# Patient Record
Sex: MALE | Race: WHITE | NOT HISPANIC OR LATINO | Employment: FULL TIME | URBAN - METROPOLITAN AREA
[De-identification: names, ages, dates, MRNs, and addresses within clinical notes are randomized per-mention and may not be internally consistent; named-entity substitution may affect disease eponyms.]

---

## 2019-03-31 ENCOUNTER — HOSPITAL ENCOUNTER (EMERGENCY)
Facility: HOSPITAL | Age: 54
Discharge: HOME | End: 2019-04-01
Attending: EMERGENCY MEDICINE
Payer: COMMERCIAL

## 2019-03-31 DIAGNOSIS — R11.2 NAUSEA AND VOMITING, INTRACTABILITY OF VOMITING NOT SPECIFIED, UNSPECIFIED VOMITING TYPE: ICD-10-CM

## 2019-03-31 DIAGNOSIS — R10.13 EPIGASTRIC PAIN: Primary | ICD-10-CM

## 2019-03-31 LAB
BASOPHILS # BLD AUTO: 0.04 K/UL (ref 0–0.2)
BASOPHILS NFR BLD: 0.6 % (ref 0–1.9)
DIFFERENTIAL METHOD: ABNORMAL
EOSINOPHIL # BLD AUTO: 0.1 K/UL (ref 0–0.5)
EOSINOPHIL NFR BLD: 1.4 % (ref 0–8)
ERYTHROCYTE [DISTWIDTH] IN BLOOD BY AUTOMATED COUNT: 12.6 % (ref 11.5–14.5)
HCT VFR BLD AUTO: 45.3 % (ref 40–54)
HGB BLD-MCNC: 15.5 G/DL (ref 14–18)
LYMPHOCYTES # BLD AUTO: 1.7 K/UL (ref 1–4.8)
LYMPHOCYTES NFR BLD: 23.4 % (ref 18–48)
MCH RBC QN AUTO: 32.1 PG (ref 27–31)
MCHC RBC AUTO-ENTMCNC: 34.2 G/DL (ref 32–36)
MCV RBC AUTO: 94 FL (ref 82–98)
MONOCYTES # BLD AUTO: 1 K/UL (ref 0.3–1)
MONOCYTES NFR BLD: 13.5 % (ref 4–15)
NEUTROPHILS # BLD AUTO: 4.3 K/UL (ref 1.8–7.7)
NEUTROPHILS NFR BLD: 61.1 % (ref 38–73)
PLATELET # BLD AUTO: 374 K/UL (ref 150–350)
PMV BLD AUTO: 9.1 FL (ref 9.2–12.9)
RBC # BLD AUTO: 4.83 M/UL (ref 4.6–6.2)
WBC # BLD AUTO: 7.06 K/UL (ref 3.9–12.7)

## 2019-03-31 PROCEDURE — 81000 URINALYSIS NONAUTO W/SCOPE: CPT

## 2019-03-31 PROCEDURE — 99284 EMERGENCY DEPT VISIT MOD MDM: CPT

## 2019-03-31 PROCEDURE — 96361 HYDRATE IV INFUSION ADD-ON: CPT

## 2019-03-31 PROCEDURE — 85025 COMPLETE CBC W/AUTO DIFF WBC: CPT

## 2019-03-31 PROCEDURE — 80053 COMPREHEN METABOLIC PANEL: CPT

## 2019-03-31 PROCEDURE — C9113 INJ PANTOPRAZOLE SODIUM, VIA: HCPCS | Performed by: PHYSICIAN ASSISTANT

## 2019-03-31 PROCEDURE — 63600175 PHARM REV CODE 636 W HCPCS: Performed by: PHYSICIAN ASSISTANT

## 2019-03-31 PROCEDURE — 83690 ASSAY OF LIPASE: CPT

## 2019-03-31 PROCEDURE — 96374 THER/PROPH/DIAG INJ IV PUSH: CPT

## 2019-03-31 PROCEDURE — 25000003 PHARM REV CODE 250: Performed by: PHYSICIAN ASSISTANT

## 2019-03-31 PROCEDURE — 96375 TX/PRO/DX INJ NEW DRUG ADDON: CPT

## 2019-03-31 RX ORDER — PANTOPRAZOLE SODIUM 40 MG/10ML
40 INJECTION, POWDER, LYOPHILIZED, FOR SOLUTION INTRAVENOUS
Status: COMPLETED | OUTPATIENT
Start: 2019-03-31 | End: 2019-03-31

## 2019-03-31 RX ORDER — KETOROLAC TROMETHAMINE 30 MG/ML
15 INJECTION, SOLUTION INTRAMUSCULAR; INTRAVENOUS
Status: COMPLETED | OUTPATIENT
Start: 2019-03-31 | End: 2019-03-31

## 2019-03-31 RX ORDER — MAG HYDROX/ALUMINUM HYD/SIMETH 200-200-20
30 SUSPENSION, ORAL (FINAL DOSE FORM) ORAL
Status: COMPLETED | OUTPATIENT
Start: 2019-03-31 | End: 2019-03-31

## 2019-03-31 RX ORDER — ONDANSETRON 2 MG/ML
4 INJECTION INTRAMUSCULAR; INTRAVENOUS
Status: COMPLETED | OUTPATIENT
Start: 2019-03-31 | End: 2019-03-31

## 2019-03-31 RX ADMIN — KETOROLAC TROMETHAMINE 15 MG: 30 INJECTION, SOLUTION INTRAMUSCULAR; INTRAVENOUS at 11:03

## 2019-03-31 RX ADMIN — SODIUM CHLORIDE 1000 ML: 0.9 INJECTION, SOLUTION INTRAVENOUS at 11:03

## 2019-03-31 RX ADMIN — ONDANSETRON 4 MG: 2 INJECTION INTRAMUSCULAR; INTRAVENOUS at 11:03

## 2019-03-31 RX ADMIN — ALUMINUM HYDROXIDE, MAGNESIUM HYDROXIDE, AND SIMETHICONE 30 ML: 200; 200; 20 SUSPENSION ORAL at 11:03

## 2019-03-31 RX ADMIN — PANTOPRAZOLE SODIUM 40 MG: 40 INJECTION, POWDER, FOR SOLUTION INTRAVENOUS at 11:03

## 2019-04-01 VITALS
OXYGEN SATURATION: 96 % | TEMPERATURE: 99 F | RESPIRATION RATE: 18 BRPM | SYSTOLIC BLOOD PRESSURE: 120 MMHG | HEIGHT: 68 IN | DIASTOLIC BLOOD PRESSURE: 88 MMHG | HEART RATE: 86 BPM | WEIGHT: 140 LBS | BODY MASS INDEX: 21.22 KG/M2

## 2019-04-01 LAB
ALBUMIN SERPL BCP-MCNC: 4 G/DL (ref 3.5–5.2)
ALP SERPL-CCNC: 66 U/L (ref 55–135)
ALT SERPL W/O P-5'-P-CCNC: 14 U/L (ref 10–44)
ANION GAP SERPL CALC-SCNC: 7 MMOL/L (ref 8–16)
AST SERPL-CCNC: 21 U/L (ref 10–40)
BACTERIA #/AREA URNS HPF: ABNORMAL /HPF
BILIRUB SERPL-MCNC: 0.7 MG/DL (ref 0.1–1)
BILIRUB UR QL STRIP: NEGATIVE
BUN SERPL-MCNC: 14 MG/DL (ref 6–20)
CALCIUM SERPL-MCNC: 9.5 MG/DL (ref 8.7–10.5)
CHLORIDE SERPL-SCNC: 102 MMOL/L (ref 95–110)
CLARITY UR: CLEAR
CO2 SERPL-SCNC: 27 MMOL/L (ref 23–29)
COLOR UR: ABNORMAL
CREAT SERPL-MCNC: 1 MG/DL (ref 0.5–1.4)
EST. GFR  (AFRICAN AMERICAN): >60 ML/MIN/1.73 M^2
EST. GFR  (NON AFRICAN AMERICAN): >60 ML/MIN/1.73 M^2
GLUCOSE SERPL-MCNC: 101 MG/DL (ref 70–110)
GLUCOSE UR QL STRIP: NEGATIVE
HGB UR QL STRIP: NEGATIVE
HYALINE CASTS #/AREA URNS LPF: 5 /LPF
KETONES UR QL STRIP: ABNORMAL
LEUKOCYTE ESTERASE UR QL STRIP: NEGATIVE
LIPASE SERPL-CCNC: 12 U/L (ref 4–60)
MICROSCOPIC COMMENT: ABNORMAL
NITRITE UR QL STRIP: NEGATIVE
PH UR STRIP: 5 [PH] (ref 5–8)
POTASSIUM SERPL-SCNC: 4.2 MMOL/L (ref 3.5–5.1)
PROT SERPL-MCNC: 6.9 G/DL (ref 6–8.4)
PROT UR QL STRIP: ABNORMAL
RBC #/AREA URNS HPF: 1 /HPF (ref 0–4)
SODIUM SERPL-SCNC: 136 MMOL/L (ref 136–145)
SP GR UR STRIP: 1.03 (ref 1–1.03)
URN SPEC COLLECT METH UR: ABNORMAL
UROBILINOGEN UR STRIP-ACNC: ABNORMAL EU/DL
WBC #/AREA URNS HPF: 3 /HPF (ref 0–5)

## 2019-04-01 RX ORDER — HYDROCODONE BITARTRATE AND ACETAMINOPHEN 5; 325 MG/1; MG/1
1 TABLET ORAL EVERY 12 HOURS PRN
Qty: 5 TABLET | Refills: 0 | Status: SHIPPED | OUTPATIENT
Start: 2019-04-01 | End: 2019-04-04

## 2019-04-01 RX ORDER — PANTOPRAZOLE SODIUM 20 MG/1
40 TABLET, DELAYED RELEASE ORAL DAILY
Qty: 60 TABLET | Refills: 0 | Status: SHIPPED | OUTPATIENT
Start: 2019-04-01 | End: 2022-10-15

## 2019-04-01 RX ORDER — ALUMINUM HYDROXIDE, MAGNESIUM HYDROXIDE, AND SIMETHICONE 2400; 240; 2400 MG/30ML; MG/30ML; MG/30ML
10 SUSPENSION ORAL EVERY 6 HOURS PRN
Qty: 335 ML | Refills: 0 | Status: SHIPPED | OUTPATIENT
Start: 2019-04-01 | End: 2022-10-15

## 2019-04-01 NOTE — ED PROVIDER NOTES
"Encounter Date: 3/31/2019       History     Chief Complaint   Patient presents with    Abdominal Pain     Pt reports, "Abdominal spasms (2 weeks), gaging, and vomiting (1 1/2 week)".  Reports "A lot of recent dental work".  States, "the last time I felt like this, I had ulcers".  Reports hx of Crohn's    Vomiting     CC: Abdominal Pain; Vomiting     HPI:   53 y/o male with history of Crohns Disease presenting for evaluation of 3 week history of constant, cramping epigastric abdominal pain and feels like a "knot" with associated 2 week history of vomiting 2x/day worsening today with 5 episodes. Pain is worse with sitting up with eating. 2 week history of gagging and vomiting x2/day for 2 weeks but worse today with 5 episodes of vomiting.  He reports he drinks 1-2 beers per day. He reports he was diagnosed with ulcers and crohns flare in 2004 which feels slightly similar.. No attempted tx.  Denies history of gallstones, hematemesis, melena, hematochezia,  CP, SOB, dizziness, lightheadedness.             Review of patient's allergies indicates:  No Known Allergies  Past Medical History:   Diagnosis Date    Degenerative disk disease     Disc disease, degenerative, thoracic or thoracolumbar     DJD (degenerative joint disease)      Past Surgical History:   Procedure Laterality Date    KNEE SURGERY  2003     History reviewed. No pertinent family history.  Social History     Tobacco Use    Smoking status: Current Every Day Smoker     Packs/day: 0.50     Types: Cigarettes    Smokeless tobacco: Never Used   Substance Use Topics    Alcohol use: Yes     Comment: occasionally    Drug use: No     Review of Systems   Constitutional: Negative for chills and fever.   HENT: Negative for congestion, ear pain and rhinorrhea.    Eyes: Negative for redness.   Respiratory: Negative for cough and shortness of breath.    Cardiovascular: Negative for chest pain.   Gastrointestinal: Positive for abdominal pain, nausea and vomiting. " Negative for blood in stool, constipation and diarrhea.   Genitourinary: Negative for dysuria, frequency, hematuria and urgency.   Musculoskeletal: Negative for back pain and neck pain.   Skin: Negative for rash.   Neurological: Negative for dizziness, speech difficulty and light-headedness.   Psychiatric/Behavioral: Negative for confusion.       Physical Exam     Initial Vitals [03/31/19 2206]   BP Pulse Resp Temp SpO2   (!) 119/94 95 16 98.9 °F (37.2 °C) 95 %      MAP       --         Physical Exam    Nursing note and vitals reviewed.  Constitutional: He appears well-developed and well-nourished. No distress.   HENT:   Head: Normocephalic.   Right Ear: External ear normal.   Left Ear: External ear normal.   Nose: Nose normal.   Mouth/Throat: Oropharynx is clear and moist. No oropharyngeal exudate.   Eyes: Conjunctivae are normal.   Neck: Neck supple.   Cardiovascular: Normal rate and regular rhythm. Exam reveals no gallop and no friction rub.    No murmur heard.  Pulmonary/Chest: Breath sounds normal. No respiratory distress. He has no wheezes. He has no rhonchi. He has no rales.   Abdominal: Soft. Bowel sounds are normal. He exhibits no distension. There is tenderness in the epigastric area. There is no rigidity, no rebound, no guarding and no CVA tenderness.   Lymphadenopathy:     He has no cervical adenopathy.   Neurological: He is alert.   Skin: Skin is warm and dry. No rash noted.   Psychiatric: He has a normal mood and affect.         ED Course   Procedures  Labs Reviewed   CBC W/ AUTO DIFFERENTIAL - Abnormal; Notable for the following components:       Result Value    MCH 32.1 (*)     Platelets 374 (*)     MPV 9.1 (*)     All other components within normal limits   COMPREHENSIVE METABOLIC PANEL - Abnormal; Notable for the following components:    Anion Gap 7 (*)     All other components within normal limits   URINALYSIS, REFLEX TO URINE CULTURE - Abnormal; Notable for the following components:    Protein,  UA 1+ (*)     Ketones, UA Trace (*)     Urobilinogen, UA 2.0-3.0 (*)     All other components within normal limits    Narrative:     Preferred Collection Type->Urine, Clean Catch   URINALYSIS MICROSCOPIC - Abnormal; Notable for the following components:    Hyaline Casts, UA 5 (*)     All other components within normal limits    Narrative:     Preferred Collection Type->Urine, Clean Catch   LIPASE          Imaging Results          US Abdomen Limited (Final result)  Result time 04/01/19 00:32:26    Final result by Kaylee Juarez MD (04/01/19 00:32:26)                 Impression:      1. Mild prominence of the common bile duct measuring 7 mm.  No evidence of cholelithiasis are ultrasonographic evidence to suggest acute cholecystitis.  2. Otherwise no acute abnormalities identified.  3. Small 1.2 cm left hepatic lobe echogenic lesion.  This is nonspecific but may reflect a small hemangioma.  Recommend comparison with previous outside imaging if available, otherwise future ultrasound surveillance would be reasonable.  Alternatively future nonemergent MR follow-up could be obtained for attempt at further characterization.      Electronically signed by: Kaylee Juarez MD  Date:    04/01/2019  Time:    00:32             Narrative:    EXAMINATION:  US ABDOMEN LIMITED    CLINICAL HISTORY:  epigastric pain, nausea, vomiting;    TECHNIQUE:  Limited ultrasound of the right upper quadrant of the abdomen was performed.    COMPARISON:  None.    FINDINGS:  The liver is normal in size measuring 15.2cm.  Hepatic parenchyma is homogeneous in echotexture.  There is a small 1.2 cm echogenic lesion visualized within the left hepatic lobe.  No intrahepatic biliary ductal dilatation.  There is mild prominence of the common bile duct measuring 0.7 cm.  The gallbladder appears normal. No evidence for cholelithiasis.  Sonographic Melton's sign is negative. The visualized portions of the pancreas, IVC, and aorta appear normal. The spleen is  normal in size measuring 9.1 cm.  No ascites.                                 Medical Decision Making:   Initial Assessment:   54-year-old male with history of Crohn's disease and gastric ulcers presenting for evaluation of 3 day history of constant epigastric pain with 2 history of associated nausea and vomiting. Patient reports vomiting worsened today causing him to come to the emergency department.  Differentials:  Pancreatitis, cholelithiasis, choledocholithiasis cholecystitis, GERD, peptic ulcer disease, gastritis, bowel obstruction, acute abdomen.       Exam above.  Patient is afebrile, well-appearing in no distress. Mild epigastric tenderness to palpation.  Labs without evidence of significant electrolyte abnormality or leukocytosis or pancreatitis.    UA is negative for infection.  Ultrasound with mildly dilated common bile duct.  Incidental finding of lesion to hepatic lobe likely hemangioma.  Discussed this with patient and will have him follow up with GI for further evaluation management of his epigastric pain as well as primary care. Doubt acute abdomen at this time. Will discharge with protonix, maalox and norco or tylenol for pain. Return to ER for worsening symptoms or as needed.                       Clinical Impression:       ICD-10-CM ICD-9-CM   1. Epigastric pain R10.13 789.06   2. Nausea and vomiting, intractability of vomiting not specified, unspecified vomiting type R11.2 787.01                                Casi Wang PA-C  04/01/19 0235

## 2019-04-01 NOTE — ED TRIAGE NOTES
"Pt here for abd pain [id number]. Hx ulcers, crohns. "guero been gagging until I throw up". Denies fever, diarrhea.   "

## 2019-05-16 ENCOUNTER — TELEPHONE (OUTPATIENT)
Dept: FAMILY MEDICINE | Facility: CLINIC | Age: 54
End: 2019-05-16

## 2019-05-16 NOTE — TELEPHONE ENCOUNTER
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   was unable to make the appt due to an exhausted template.  He is requesting a returned call for scheduling.    He can be reached at [phone].    Thank you.

## 2019-05-24 ENCOUNTER — HOSPITAL ENCOUNTER (EMERGENCY)
Facility: HOSPITAL | Age: 54
Discharge: HOME | End: 2019-05-24
Attending: EMERGENCY MEDICINE
Payer: COMMERCIAL

## 2019-05-24 VITALS
BODY MASS INDEX: 23.34 KG/M2 | SYSTOLIC BLOOD PRESSURE: 147 MMHG | OXYGEN SATURATION: 100 % | TEMPERATURE: 99 F | DIASTOLIC BLOOD PRESSURE: 79 MMHG | RESPIRATION RATE: 20 BRPM | HEIGHT: 68 IN | HEART RATE: 78 BPM | WEIGHT: 154 LBS

## 2019-05-24 DIAGNOSIS — S62.356A CLOSED NONDISPLACED FRACTURE OF SHAFT OF FIFTH METACARPAL BONE OF RIGHT HAND, INITIAL ENCOUNTER: Primary | ICD-10-CM

## 2019-05-24 PROCEDURE — 99283 EMERGENCY DEPT VISIT LOW MDM: CPT | Mod: 25

## 2019-05-24 PROCEDURE — 29125 APPL SHORT ARM SPLINT STATIC: CPT | Mod: RT

## 2019-05-24 RX ORDER — GABAPENTIN 300 MG/1
CAPSULE ORAL
COMMUNITY
Start: 2019-05-09 | End: 2022-10-15

## 2019-05-24 RX ORDER — OXYCODONE AND ACETAMINOPHEN 10; 325 MG/1; MG/1
TABLET ORAL
Refills: 0 | COMMUNITY
Start: 2019-05-08 | End: 2020-04-23 | Stop reason: SDUPTHER

## 2019-05-24 NOTE — ED PROVIDER NOTES
Encounter Date: 5/24/2019    SCRIBE #1 NOTE: I, Olivia Bautista, am scribing for, and in the presence of,  Alexandr Becerra PA-C. I have scribed the following portions of the note - Other sections scribed: HPI, ROS.       History     Chief Complaint   Patient presents with    Hand Injury     Pt c/o pain in the right hand after hitting it on a took box last night, states he just wants to get an x-ray to make sure it's not broken     CC: Hand Injury    HPI: This 53 y/o male presents to the ED for evaluation of R hand pain after injury. Patient reports he slammed his R hand on a tool box out of frustration last night. Patient states he is concerned it might be broken and wants an x-ray. Patient is R handed. Patient broke 2nd digit of R hand previously. Patient denies fever, chills, leg swelling, congestion, or N/V/D. No alleviating or exacerbating factors reported.      The history is provided by the patient. No  was used.     Review of patient's allergies indicates:  No Known Allergies  Past Medical History:   Diagnosis Date    Crohn's disease     pt reported    Degenerative disk disease     Disc disease, degenerative, thoracic or thoracolumbar     DJD (degenerative joint disease)      Past Surgical History:   Procedure Laterality Date    KNEE SURGERY  2003     History reviewed. No pertinent family history.  Social History     Tobacco Use    Smoking status: Current Every Day Smoker     Packs/day: 0.50     Types: Cigarettes    Smokeless tobacco: Never Used   Substance Use Topics    Alcohol use: Yes     Comment: occasionally    Drug use: No     Review of Systems   Constitutional: Negative for chills and fever.   HENT: Negative for congestion, ear discharge, ear pain, nosebleeds, rhinorrhea and sore throat.    Respiratory: Negative for cough and chest tightness.    Cardiovascular: Negative for chest pain.   Gastrointestinal: Negative for abdominal pain, diarrhea, nausea and vomiting.    Genitourinary: Negative for dysuria, flank pain, hematuria and urgency.   Musculoskeletal: Negative for back pain, myalgias and neck pain.        (+) R hand pain   Skin: Negative for rash.   Neurological: Negative for dizziness, weakness, light-headedness, numbness and headaches.   Psychiatric/Behavioral: Negative for agitation.       Physical Exam     Initial Vitals [05/24/19 0659]   BP Pulse Resp Temp SpO2   (!) 128/95 74 18 98.4 °F (36.9 °C) 97 %      MAP       --         Physical Exam    Nursing note and vitals reviewed.  Constitutional: He appears well-developed and well-nourished. He is not diaphoretic. No distress.   HENT:   Head: Normocephalic and atraumatic.   Eyes: Conjunctivae and EOM are normal. Pupils are equal, round, and reactive to light.   Neck: Normal range of motion. Neck supple.   Cardiovascular: Intact distal pulses.   Pulmonary/Chest: No respiratory distress.   Abdominal: Soft. Bowel sounds are normal. He exhibits no distension. There is no tenderness.   Musculoskeletal:   R hand 5th metacarpal with dorsal swelling, ecchymosis. There is pain to palpation of entirety of this region. No obvious bony deformity. Small abrasion to proximal ulnar aspect of hand. Cap refill wnl all digits. Limited flexion of 5th digit.    Neurological: He is alert and oriented to person, place, and time. He has normal strength.   Skin: Skin is warm and dry. Capillary refill takes less than 2 seconds. No rash and no abscess noted. No erythema.   Psychiatric: He has a normal mood and affect. His behavior is normal. Judgment and thought content normal.         ED Course   Splint Application  Date/Time: 5/24/2019 8:18 AM  Performed by: Alexandr Becerra PA-C  Authorized by: Earnest Gastelum MD   Location details: right hand  Splint type: ulnar gutter  Supplies used: Ortho-Glass  Post-procedure: The splinted body part was neurovascularly unchanged following the procedure.  Patient tolerance: Patient tolerated the  procedure well with no immediate complications        Labs Reviewed - No data to display       Imaging Results          X-Ray Hand 3 view Right (Final result)  Result time 05/24/19 07:52:40    Final result by Bry Smith MD (05/24/19 07:52:40)                 Impression:      Fifth metacarpal fracture      Electronically signed by: Bry Smith MD  Date:    05/24/2019  Time:    07:52             Narrative:    EXAMINATION:  XR HAND COMPLETE 3 VIEW RIGHT    CLINICAL HISTORY:  R hand trauma; 5th metacarpal fx;    TECHNIQUE:  PA, lateral, and oblique views of the right hand were performed.    COMPARISON:  None    FINDINGS:  Distal shaft of the 5th metacarpal shows acute, obliquely oriented fracture with mild volar angulation but no dislocation.  Soft tissue swelling is present in the area.  No other acute fracture is seen.  Mild to moderate osteoarthritis is present at multiple joints.                                 Medical Decision Making:   Differential Diagnosis:   Fracture, contusion, sprain/strain, arthritis  Clinical Tests:   Radiological Study: Ordered and Reviewed  ED Management:  54-year-old male with chief complaint right hand pain.  He is right-handed.  Patient states he slammed his fist down on a metal tool box in frustration.  Here for imaging.  Previous fracture of 2nd digit, denies any surgery to hand.  No radiculopathy or paresthesia.  Swelling noted to entirety of dorsal 5th metacarpal region.  Decreased flexion 5th digit.  Cap refill normal to all digits.  There is a small abrasion to the base of the 5th metacarpal to the ulnar aspect of the hand.     There is a shaft fracture of the 5th metacarpal.  I do not think this represents an open fracture.  Less than 40° of shaft angulation.  No significant shaft shortening.  No significant malalignment.  I will place in ulnar gutter, have him follow up with Dr. Marie.  He does understand and agree with this plan.  Return precautions  given.            Scribe Attestation:   Scribe #1: I performed the above scribed service and the documentation accurately describes the services I performed. I attest to the accuracy of the note.    Attending Attestation:           Physician Attestation for Scribe:  Physician Attestation Statement for Scribe #1: I, Alexandr Becerra PA-C, reviewed documentation, as scribed by Olivia Bautista in my presence, and it is both accurate and complete.                    Clinical Impression:       ICD-10-CM ICD-9-CM   1. Closed nondisplaced fracture of shaft of fifth metacarpal bone of right hand, initial encounter S62.356A 815.03         Disposition:   Disposition: Discharged  Condition: Stable                        Alexandr Becerra PA-C  05/24/19 0818

## 2019-05-24 NOTE — DISCHARGE INSTRUCTIONS
Follow-up with Orthopedics next week for re-evaluation.  Continue taking your current pain medications.    Return to this ED if hand becomes red and warm, if your fingers become blue or discolored, if unable to tolerate pain, if any other problems occur.

## 2019-05-24 NOTE — ED TRIAGE NOTES
The pt states he hit his right hand on his tool kit yesterday. Now his right hand is swollen and hard for him to make a fist.

## 2020-04-23 ENCOUNTER — HOSPITAL ENCOUNTER (EMERGENCY)
Facility: HOSPITAL | Age: 55
Discharge: HOME | End: 2020-04-23
Attending: EMERGENCY MEDICINE
Payer: COMMERCIAL

## 2020-04-23 VITALS
SYSTOLIC BLOOD PRESSURE: 119 MMHG | BODY MASS INDEX: 22.73 KG/M2 | WEIGHT: 150 LBS | HEIGHT: 68 IN | RESPIRATION RATE: 16 BRPM | OXYGEN SATURATION: 95 % | TEMPERATURE: 98 F | DIASTOLIC BLOOD PRESSURE: 79 MMHG | HEART RATE: 88 BPM
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
## 2020-04-23 DIAGNOSIS — M54.9 ACUTE MIDLINE BACK PAIN, UNSPECIFIED BACK LOCATION: ICD-10-CM

## 2020-04-23 DIAGNOSIS — M25.511 RIGHT SHOULDER PAIN: ICD-10-CM

## 2020-04-23 PROCEDURE — 96372 THER/PROPH/DIAG INJ SC/IM: CPT

## 2020-04-23 PROCEDURE — 63600175 PHARM REV CODE 636 W HCPCS: Performed by: NURSE PRACTITIONER

## 2020-04-23 PROCEDURE — 99284 EMERGENCY DEPT VISIT MOD MDM: CPT | Mod: 25

## 2020-04-23 RX ORDER — OXYCODONE AND ACETAMINOPHEN 10; 325 MG/1; MG/1
1 TABLET ORAL EVERY 6 HOURS PRN
Qty: 12 TABLET | Refills: 0 | OUTPATIENT
Start: 2020-04-23 | End: 2021-03-25

## 2020-04-23 RX ORDER — HYDROMORPHONE HYDROCHLORIDE 2 MG/ML
1 INJECTION, SOLUTION INTRAMUSCULAR; INTRAVENOUS; SUBCUTANEOUS
Status: COMPLETED | OUTPATIENT
Start: 2020-04-23 | End: 2020-04-23

## 2020-04-23 RX ORDER — CYCLOBENZAPRINE HCL 10 MG
10 TABLET ORAL 3 TIMES DAILY PRN
Qty: 15 TABLET | Refills: 0 | Status: SHIPPED | OUTPATIENT
Start: 2020-04-23 | End: 2020-04-28

## 2020-04-23 RX ADMIN — HYDROMORPHONE HYDROCHLORIDE 1 MG: 2 INJECTION, SOLUTION INTRAMUSCULAR; INTRAVENOUS; SUBCUTANEOUS at 10:04
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
## 2021-01-22 ENCOUNTER — HOSPITAL ENCOUNTER (EMERGENCY)
Facility: HOSPITAL | Age: 56
Discharge: HOME | End: 2021-01-22
Attending: EMERGENCY MEDICINE
Payer: OTHER GOVERNMENT

## 2021-01-22 VITALS
HEIGHT: 68 IN | WEIGHT: 149 LBS | OXYGEN SATURATION: 99 % | HEART RATE: 70 BPM | DIASTOLIC BLOOD PRESSURE: 90 MMHG | BODY MASS INDEX: 22.58 KG/M2 | RESPIRATION RATE: 20 BRPM | SYSTOLIC BLOOD PRESSURE: 142 MMHG | TEMPERATURE: 98 F

## 2021-01-22 DIAGNOSIS — K50.919 CROHN'S DISEASE WITH COMPLICATION, UNSPECIFIED GASTROINTESTINAL TRACT LOCATION: Primary | ICD-10-CM

## 2021-01-22 LAB
ALBUMIN SERPL BCP-MCNC: 3.6 G/DL (ref 3.5–5.2)
ALP SERPL-CCNC: 52 U/L (ref 55–135)
ALT SERPL W/O P-5'-P-CCNC: 16 U/L (ref 10–44)
ANION GAP SERPL CALC-SCNC: 7 MMOL/L (ref 8–16)
AST SERPL-CCNC: 24 U/L (ref 10–40)
BASOPHILS # BLD AUTO: 0.07 K/UL (ref 0–0.2)
BASOPHILS NFR BLD: 1 % (ref 0–1.9)
BILIRUB SERPL-MCNC: 0.4 MG/DL (ref 0.1–1)
BILIRUB UR QL STRIP: NEGATIVE
BUN SERPL-MCNC: 13 MG/DL (ref 6–20)
CALCIUM SERPL-MCNC: 8.7 MG/DL (ref 8.7–10.5)
CHLORIDE SERPL-SCNC: 100 MMOL/L (ref 95–110)
CLARITY UR: CLEAR
CO2 SERPL-SCNC: 30 MMOL/L (ref 23–29)
COLOR UR: YELLOW
CREAT SERPL-MCNC: 1 MG/DL (ref 0.5–1.4)
CTP QC/QA: YES
DIFFERENTIAL METHOD: ABNORMAL
EOSINOPHIL # BLD AUTO: 0.2 K/UL (ref 0–0.5)
EOSINOPHIL NFR BLD: 3.1 % (ref 0–8)
ERYTHROCYTE [DISTWIDTH] IN BLOOD BY AUTOMATED COUNT: 12.2 % (ref 11.5–14.5)
EST. GFR  (AFRICAN AMERICAN): >60 ML/MIN/1.73 M^2
EST. GFR  (NON AFRICAN AMERICAN): >60 ML/MIN/1.73 M^2
GLUCOSE SERPL-MCNC: 90 MG/DL (ref 70–110)
GLUCOSE UR QL STRIP: NEGATIVE
HCT VFR BLD AUTO: 42.1 % (ref 40–54)
HGB BLD-MCNC: 14.5 G/DL (ref 14–18)
HGB UR QL STRIP: NEGATIVE
IMM GRANULOCYTES # BLD AUTO: 0.01 K/UL (ref 0–0.04)
IMM GRANULOCYTES NFR BLD AUTO: 0.1 % (ref 0–0.5)
KETONES UR QL STRIP: ABNORMAL
LEUKOCYTE ESTERASE UR QL STRIP: NEGATIVE
LIPASE SERPL-CCNC: 17 U/L (ref 4–60)
LYMPHOCYTES # BLD AUTO: 1.3 K/UL (ref 1–4.8)
LYMPHOCYTES NFR BLD: 19.2 % (ref 18–48)
MCH RBC QN AUTO: 32.2 PG (ref 27–31)
MCHC RBC AUTO-ENTMCNC: 34.4 G/DL (ref 32–36)
MCV RBC AUTO: 93 FL (ref 82–98)
MONOCYTES # BLD AUTO: 1.2 K/UL (ref 0.3–1)
MONOCYTES NFR BLD: 17.7 % (ref 4–15)
NEUTROPHILS # BLD AUTO: 4 K/UL (ref 1.8–7.7)
NEUTROPHILS NFR BLD: 58.9 % (ref 38–73)
NITRITE UR QL STRIP: NEGATIVE
NRBC BLD-RTO: 0 /100 WBC
PH UR STRIP: 7 [PH] (ref 5–8)
PLATELET # BLD AUTO: 323 K/UL (ref 150–350)
PMV BLD AUTO: 8.6 FL (ref 9.2–12.9)
POTASSIUM SERPL-SCNC: 4 MMOL/L (ref 3.5–5.1)
PROT SERPL-MCNC: 6.6 G/DL (ref 6–8.4)
PROT UR QL STRIP: NEGATIVE
RBC # BLD AUTO: 4.51 M/UL (ref 4.6–6.2)
SARS-COV-2 RDRP RESP QL NAA+PROBE: NEGATIVE
SODIUM SERPL-SCNC: 137 MMOL/L (ref 136–145)
SP GR UR STRIP: 1.02 (ref 1–1.03)
URN SPEC COLLECT METH UR: ABNORMAL
UROBILINOGEN UR STRIP-ACNC: NEGATIVE EU/DL
WBC # BLD AUTO: 6.77 K/UL (ref 3.9–12.7)

## 2021-01-22 PROCEDURE — 81003 URINALYSIS AUTO W/O SCOPE: CPT

## 2021-01-22 PROCEDURE — 96361 HYDRATE IV INFUSION ADD-ON: CPT

## 2021-01-22 PROCEDURE — 25000003 PHARM REV CODE 250: Performed by: PHYSICIAN ASSISTANT

## 2021-01-22 PROCEDURE — 96374 THER/PROPH/DIAG INJ IV PUSH: CPT

## 2021-01-22 PROCEDURE — 63600175 PHARM REV CODE 636 W HCPCS: Performed by: PHYSICIAN ASSISTANT

## 2021-01-22 PROCEDURE — U0002 COVID-19 LAB TEST NON-CDC: HCPCS | Performed by: EMERGENCY MEDICINE

## 2021-01-22 PROCEDURE — 85025 COMPLETE CBC W/AUTO DIFF WBC: CPT

## 2021-01-22 PROCEDURE — 83690 ASSAY OF LIPASE: CPT

## 2021-01-22 PROCEDURE — 80053 COMPREHEN METABOLIC PANEL: CPT

## 2021-01-22 PROCEDURE — 99284 EMERGENCY DEPT VISIT MOD MDM: CPT | Mod: 25

## 2021-01-22 RX ORDER — ONDANSETRON 2 MG/ML
4 INJECTION INTRAMUSCULAR; INTRAVENOUS
Status: COMPLETED | OUTPATIENT
Start: 2021-01-22 | End: 2021-01-22

## 2021-01-22 RX ORDER — ONDANSETRON 8 MG/1
8 TABLET, ORALLY DISINTEGRATING ORAL EVERY 8 HOURS PRN
Qty: 14 TABLET | Refills: 0 | Status: SHIPPED | OUTPATIENT
Start: 2021-01-22 | End: 2021-02-04 | Stop reason: SDUPTHER

## 2021-01-22 RX ORDER — CIPROFLOXACIN 500 MG/1
500 TABLET ORAL 2 TIMES DAILY
Qty: 20 TABLET | Refills: 0 | Status: SHIPPED | OUTPATIENT
Start: 2021-01-22 | End: 2021-02-01

## 2021-01-22 RX ORDER — METRONIDAZOLE 500 MG/1
500 TABLET ORAL 3 TIMES DAILY
Qty: 30 TABLET | Refills: 0 | Status: SHIPPED | OUTPATIENT
Start: 2021-01-22 | End: 2021-02-01

## 2021-01-22 RX ORDER — PREDNISONE 10 MG/1
TABLET ORAL
Qty: 21 TABLET | Refills: 0 | Status: SHIPPED | OUTPATIENT
Start: 2021-01-22 | End: 2022-10-15

## 2021-01-22 RX ADMIN — ONDANSETRON 4 MG: 2 INJECTION INTRAMUSCULAR; INTRAVENOUS at 03:01

## 2021-01-22 RX ADMIN — SODIUM CHLORIDE 1000 ML: 0.9 INJECTION, SOLUTION INTRAVENOUS at 03:01

## 2021-02-04 ENCOUNTER — OFFICE VISIT (OUTPATIENT)
Dept: FAMILY MEDICINE | Facility: CLINIC | Age: 56
End: 2021-02-04
Payer: COMMERCIAL

## 2021-02-04 VITALS
WEIGHT: 150.81 LBS | SYSTOLIC BLOOD PRESSURE: 130 MMHG | HEART RATE: 76 BPM | RESPIRATION RATE: 19 BRPM | DIASTOLIC BLOOD PRESSURE: 72 MMHG | BODY MASS INDEX: 22.93 KG/M2 | OXYGEN SATURATION: 99 %

## 2021-02-04 DIAGNOSIS — M54.50 CHRONIC BILATERAL LOW BACK PAIN WITHOUT SCIATICA: ICD-10-CM

## 2021-02-04 DIAGNOSIS — K50.10 CROHN'S DISEASE OF COLON WITHOUT COMPLICATION: ICD-10-CM

## 2021-02-04 DIAGNOSIS — G89.29 CHRONIC BILATERAL LOW BACK PAIN WITHOUT SCIATICA: ICD-10-CM

## 2021-02-04 DIAGNOSIS — Z00.00 VISIT FOR WELL MAN HEALTH CHECK: Primary | ICD-10-CM

## 2021-02-04 PROCEDURE — 99386 PR PREVENTIVE VISIT,NEW,40-64: ICD-10-PCS | Mod: S$GLB,,, | Performed by: FAMILY MEDICINE

## 2021-02-04 PROCEDURE — 3008F BODY MASS INDEX DOCD: CPT | Mod: CPTII,S$GLB,, | Performed by: FAMILY MEDICINE

## 2021-02-04 PROCEDURE — 99999 PR PBB SHADOW E&M-EST. PATIENT-LVL III: CPT | Mod: PBBFAC,,, | Performed by: FAMILY MEDICINE

## 2021-02-04 PROCEDURE — 99999 PR PBB SHADOW E&M-EST. PATIENT-LVL III: ICD-10-PCS | Mod: PBBFAC,,, | Performed by: FAMILY MEDICINE

## 2021-02-04 PROCEDURE — 99386 PREV VISIT NEW AGE 40-64: CPT | Mod: S$GLB,,, | Performed by: FAMILY MEDICINE

## 2021-02-04 PROCEDURE — 3008F PR BODY MASS INDEX (BMI) DOCUMENTED: ICD-10-PCS | Mod: CPTII,S$GLB,, | Performed by: FAMILY MEDICINE

## 2021-02-04 RX ORDER — ONDANSETRON 8 MG/1
8 TABLET, ORALLY DISINTEGRATING ORAL EVERY 8 HOURS PRN
Qty: 30 TABLET | Refills: 1 | Status: SHIPPED | OUTPATIENT
Start: 2021-02-04 | End: 2021-10-02 | Stop reason: SDUPTHER

## 2021-02-11 DIAGNOSIS — E78.5 DYSLIPIDEMIA: Primary | ICD-10-CM

## 2021-02-11 RX ORDER — ATORVASTATIN CALCIUM 20 MG/1
20 TABLET, FILM COATED ORAL DAILY
Qty: 90 TABLET | Refills: 1 | Status: SHIPPED | OUTPATIENT
Start: 2021-02-11 | End: 2022-10-15

## 2021-02-12 ENCOUNTER — TELEPHONE (OUTPATIENT)
Dept: FAMILY MEDICINE | Facility: CLINIC | Age: 56
End: 2021-02-12

## 2021-03-25 ENCOUNTER — HOSPITAL ENCOUNTER (EMERGENCY)
Facility: HOSPITAL | Age: 56
Discharge: HOME | End: 2021-03-25
Attending: EMERGENCY MEDICINE
Payer: COMMERCIAL

## 2021-03-25 VITALS
SYSTOLIC BLOOD PRESSURE: 150 MMHG | OXYGEN SATURATION: 98 % | HEIGHT: 68 IN | BODY MASS INDEX: 22.73 KG/M2 | DIASTOLIC BLOOD PRESSURE: 90 MMHG | WEIGHT: 150 LBS | RESPIRATION RATE: 16 BRPM | HEART RATE: 71 BPM | TEMPERATURE: 98 F
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
## 2021-03-25 DIAGNOSIS — M25.532 LEFT WRIST PAIN: Primary | ICD-10-CM

## 2021-03-25 PROCEDURE — 63600175 PHARM REV CODE 636 W HCPCS: Performed by: PHYSICIAN ASSISTANT

## 2021-03-25 PROCEDURE — 90715 TDAP VACCINE 7 YRS/> IM: CPT | Performed by: PHYSICIAN ASSISTANT

## 2021-03-25 PROCEDURE — 25000003 PHARM REV CODE 250: Performed by: PHYSICIAN ASSISTANT

## 2021-03-25 PROCEDURE — 29125 APPL SHORT ARM SPLINT STATIC: CPT | Mod: LT

## 2021-03-25 PROCEDURE — 90471 IMMUNIZATION ADMIN: CPT | Performed by: PHYSICIAN ASSISTANT

## 2021-03-25 PROCEDURE — 99283 EMERGENCY DEPT VISIT LOW MDM: CPT | Mod: 25

## 2021-03-25 RX ORDER — OXYCODONE AND ACETAMINOPHEN 5; 325 MG/1; MG/1
1 TABLET ORAL
Status: COMPLETED | OUTPATIENT
Start: 2021-03-25 | End: 2021-03-25

## 2021-03-25 RX ORDER — OXYCODONE AND ACETAMINOPHEN 5; 325 MG/1; MG/1
1 TABLET ORAL EVERY 6 HOURS PRN
Qty: 5 TABLET | Refills: 0 | Status: SHIPPED | OUTPATIENT
Start: 2021-03-25 | End: 2022-10-15

## 2021-03-25 RX ADMIN — CLOSTRIDIUM TETANI TOXOID ANTIGEN (FORMALDEHYDE INACTIVATED), CORYNEBACTERIUM DIPHTHERIAE TOXOID ANTIGEN (FORMALDEHYDE INACTIVATED), BORDETELLA PERTUSSIS TOXOID ANTIGEN (GLUTARALDEHYDE INACTIVATED), BORDETELLA PERTUSSIS FILAMENTOUS HEMAGGLUTININ ANTIGEN (FORMALDEHYDE INACTIVATED), BORDETELLA PERTUSSIS PERTACTIN ANTIGEN, AND BORDETELLA PERTUSSIS FIMBRIAE 2/3 ANTIGEN 0.5 ML: 5; 2; 2.5; 5; 3; 5 INJECTION, SUSPENSION INTRAMUSCULAR at 09:03

## 2021-03-25 RX ADMIN — OXYCODONE HYDROCHLORIDE AND ACETAMINOPHEN 1 TABLET: 5; 325 TABLET ORAL at 09:03

## 2021-07-01 ENCOUNTER — OFFICE VISIT (OUTPATIENT)
Dept: FAMILY MEDICINE | Facility: CLINIC | Age: 56
End: 2021-07-01
Payer: COMMERCIAL

## 2021-07-01 VITALS
SYSTOLIC BLOOD PRESSURE: 110 MMHG | DIASTOLIC BLOOD PRESSURE: 70 MMHG | WEIGHT: 147.5 LBS | OXYGEN SATURATION: 96 % | TEMPERATURE: 98 F | HEART RATE: 83 BPM | RESPIRATION RATE: 18 BRPM | HEIGHT: 68 IN | BODY MASS INDEX: 22.35 KG/M2

## 2021-07-01 DIAGNOSIS — R00.2 PALPITATIONS: ICD-10-CM

## 2021-07-01 DIAGNOSIS — R55 SYNCOPE AND COLLAPSE: ICD-10-CM

## 2021-07-01 DIAGNOSIS — F32.A ANXIETY AND DEPRESSION: Primary | ICD-10-CM

## 2021-07-01 DIAGNOSIS — F41.9 ANXIETY AND DEPRESSION: Primary | ICD-10-CM

## 2021-07-01 PROCEDURE — 3008F PR BODY MASS INDEX (BMI) DOCUMENTED: ICD-10-PCS | Mod: CPTII,S$GLB,, | Performed by: NURSE PRACTITIONER

## 2021-07-01 PROCEDURE — 99214 PR OFFICE/OUTPT VISIT, EST, LEVL IV, 30-39 MIN: ICD-10-PCS | Mod: S$GLB,,, | Performed by: NURSE PRACTITIONER

## 2021-07-01 PROCEDURE — 99214 OFFICE O/P EST MOD 30 MIN: CPT | Mod: S$GLB,,, | Performed by: NURSE PRACTITIONER

## 2021-07-01 PROCEDURE — 1126F AMNT PAIN NOTED NONE PRSNT: CPT | Mod: S$GLB,,, | Performed by: NURSE PRACTITIONER

## 2021-07-01 PROCEDURE — 99999 PR PBB SHADOW E&M-EST. PATIENT-LVL IV: ICD-10-PCS | Mod: PBBFAC,,, | Performed by: NURSE PRACTITIONER

## 2021-07-01 PROCEDURE — 99999 PR PBB SHADOW E&M-EST. PATIENT-LVL IV: CPT | Mod: PBBFAC,,, | Performed by: NURSE PRACTITIONER

## 2021-07-01 PROCEDURE — 3008F BODY MASS INDEX DOCD: CPT | Mod: CPTII,S$GLB,, | Performed by: NURSE PRACTITIONER

## 2021-07-01 PROCEDURE — 1126F PR PAIN SEVERITY QUANTIFIED, NO PAIN PRESENT: ICD-10-PCS | Mod: S$GLB,,, | Performed by: NURSE PRACTITIONER

## 2021-07-01 RX ORDER — HYDROXYZINE HYDROCHLORIDE 25 MG/1
25 TABLET, FILM COATED ORAL 3 TIMES DAILY
Qty: 60 TABLET | Refills: 0 | Status: SHIPPED | OUTPATIENT
Start: 2021-07-01 | End: 2022-10-15

## 2021-07-06 ENCOUNTER — PATIENT OUTREACH (OUTPATIENT)
Dept: ADMINISTRATIVE | Facility: OTHER | Age: 56
End: 2021-07-06

## 2021-07-08 ENCOUNTER — OFFICE VISIT (OUTPATIENT)
Dept: CARDIOLOGY | Facility: CLINIC | Age: 56
End: 2021-07-08
Payer: COMMERCIAL

## 2021-07-08 VITALS
SYSTOLIC BLOOD PRESSURE: 118 MMHG | DIASTOLIC BLOOD PRESSURE: 72 MMHG | WEIGHT: 151 LBS | HEART RATE: 79 BPM | HEIGHT: 68 IN | BODY MASS INDEX: 22.88 KG/M2 | OXYGEN SATURATION: 97 %

## 2021-07-08 DIAGNOSIS — R55 SYNCOPE AND COLLAPSE: ICD-10-CM

## 2021-07-08 DIAGNOSIS — R00.2 PALPITATIONS: ICD-10-CM

## 2021-07-08 PROCEDURE — 99999 PR PBB SHADOW E&M-EST. PATIENT-LVL IV: CPT | Mod: PBBFAC,,, | Performed by: INTERNAL MEDICINE

## 2021-07-08 PROCEDURE — 1126F PR PAIN SEVERITY QUANTIFIED, NO PAIN PRESENT: ICD-10-PCS | Mod: S$GLB,,, | Performed by: INTERNAL MEDICINE

## 2021-07-08 PROCEDURE — 99204 PR OFFICE/OUTPT VISIT, NEW, LEVL IV, 45-59 MIN: ICD-10-PCS | Mod: 25,S$GLB,, | Performed by: INTERNAL MEDICINE

## 2021-07-08 PROCEDURE — 1126F AMNT PAIN NOTED NONE PRSNT: CPT | Mod: S$GLB,,, | Performed by: INTERNAL MEDICINE

## 2021-07-08 PROCEDURE — 93000 EKG 12-LEAD: ICD-10-PCS | Mod: S$GLB,,, | Performed by: INTERNAL MEDICINE

## 2021-07-08 PROCEDURE — 93000 ELECTROCARDIOGRAM COMPLETE: CPT | Mod: S$GLB,,, | Performed by: INTERNAL MEDICINE

## 2021-07-08 PROCEDURE — 3008F BODY MASS INDEX DOCD: CPT | Mod: CPTII,S$GLB,, | Performed by: INTERNAL MEDICINE

## 2021-07-08 PROCEDURE — 3008F PR BODY MASS INDEX (BMI) DOCUMENTED: ICD-10-PCS | Mod: CPTII,S$GLB,, | Performed by: INTERNAL MEDICINE

## 2021-07-08 PROCEDURE — 99204 OFFICE O/P NEW MOD 45 MIN: CPT | Mod: 25,S$GLB,, | Performed by: INTERNAL MEDICINE

## 2021-07-08 PROCEDURE — 99999 PR PBB SHADOW E&M-EST. PATIENT-LVL IV: ICD-10-PCS | Mod: PBBFAC,,, | Performed by: INTERNAL MEDICINE

## 2021-07-14 ENCOUNTER — HOSPITAL ENCOUNTER (EMERGENCY)
Facility: HOSPITAL | Age: 56
Discharge: LEFT BEFORE DISCHARGE | End: 2021-07-14
Payer: COMMERCIAL

## 2021-07-14 VITALS
OXYGEN SATURATION: 98 % | SYSTOLIC BLOOD PRESSURE: 133 MMHG | RESPIRATION RATE: 16 BRPM | DIASTOLIC BLOOD PRESSURE: 94 MMHG | WEIGHT: 145 LBS | HEART RATE: 69 BPM | TEMPERATURE: 98 F | HEIGHT: 68 IN | BODY MASS INDEX: 21.98 KG/M2

## 2021-07-14 DIAGNOSIS — R07.9 CHEST PAIN: ICD-10-CM

## 2021-07-14 LAB
ALBUMIN SERPL BCP-MCNC: 4.1 G/DL (ref 3.5–5.2)
ALP SERPL-CCNC: 53 U/L (ref 55–135)
ALT SERPL W/O P-5'-P-CCNC: 22 U/L (ref 10–44)
ANION GAP SERPL CALC-SCNC: 6 MMOL/L (ref 8–16)
AST SERPL-CCNC: 20 U/L (ref 10–40)
BASOPHILS # BLD AUTO: 0.12 K/UL (ref 0–0.2)
BASOPHILS NFR BLD: 1.4 % (ref 0–1.9)
BILIRUB SERPL-MCNC: 0.4 MG/DL (ref 0.1–1)
BNP SERPL-MCNC: <10 PG/ML (ref 0–99)
BUN SERPL-MCNC: 12 MG/DL (ref 6–20)
CALCIUM SERPL-MCNC: 9.3 MG/DL (ref 8.7–10.5)
CHLORIDE SERPL-SCNC: 105 MMOL/L (ref 95–110)
CO2 SERPL-SCNC: 28 MMOL/L (ref 23–29)
CREAT SERPL-MCNC: 1.2 MG/DL (ref 0.5–1.4)
DIFFERENTIAL METHOD: ABNORMAL
EOSINOPHIL # BLD AUTO: 0.4 K/UL (ref 0–0.5)
EOSINOPHIL NFR BLD: 4.8 % (ref 0–8)
ERYTHROCYTE [DISTWIDTH] IN BLOOD BY AUTOMATED COUNT: 13.1 % (ref 11.5–14.5)
EST. GFR  (AFRICAN AMERICAN): >60 ML/MIN/1.73 M^2
EST. GFR  (NON AFRICAN AMERICAN): >60 ML/MIN/1.73 M^2
GLUCOSE SERPL-MCNC: 89 MG/DL (ref 70–110)
HCT VFR BLD AUTO: 46.8 % (ref 40–54)
HGB BLD-MCNC: 15.8 G/DL (ref 14–18)
IMM GRANULOCYTES # BLD AUTO: 0.02 K/UL (ref 0–0.04)
IMM GRANULOCYTES NFR BLD AUTO: 0.2 % (ref 0–0.5)
LYMPHOCYTES # BLD AUTO: 2.3 K/UL (ref 1–4.8)
LYMPHOCYTES NFR BLD: 26.8 % (ref 18–48)
MCH RBC QN AUTO: 31.7 PG (ref 27–31)
MCHC RBC AUTO-ENTMCNC: 33.8 G/DL (ref 32–36)
MCV RBC AUTO: 94 FL (ref 82–98)
MONOCYTES # BLD AUTO: 0.9 K/UL (ref 0.3–1)
MONOCYTES NFR BLD: 11 % (ref 4–15)
NEUTROPHILS # BLD AUTO: 4.7 K/UL (ref 1.8–7.7)
NEUTROPHILS NFR BLD: 55.8 % (ref 38–73)
NRBC BLD-RTO: 0 /100 WBC
PLATELET # BLD AUTO: 302 K/UL (ref 150–450)
PMV BLD AUTO: 8.8 FL (ref 9.2–12.9)
POTASSIUM SERPL-SCNC: 4.8 MMOL/L (ref 3.5–5.1)
PROT SERPL-MCNC: 7.4 G/DL (ref 6–8.4)
RBC # BLD AUTO: 4.99 M/UL (ref 4.6–6.2)
SODIUM SERPL-SCNC: 139 MMOL/L (ref 136–145)
TROPONIN I SERPL DL<=0.01 NG/ML-MCNC: <0.006 NG/ML (ref 0–0.03)
WBC # BLD AUTO: 8.47 K/UL (ref 3.9–12.7)

## 2021-07-14 PROCEDURE — 85025 COMPLETE CBC W/AUTO DIFF WBC: CPT | Performed by: PHYSICIAN ASSISTANT

## 2021-07-14 PROCEDURE — 93010 EKG 12-LEAD: ICD-10-PCS | Mod: ,,, | Performed by: INTERNAL MEDICINE

## 2021-07-14 PROCEDURE — 84484 ASSAY OF TROPONIN QUANT: CPT | Performed by: PHYSICIAN ASSISTANT

## 2021-07-14 PROCEDURE — 93010 ELECTROCARDIOGRAM REPORT: CPT | Mod: ,,, | Performed by: INTERNAL MEDICINE

## 2021-07-14 PROCEDURE — 80053 COMPREHEN METABOLIC PANEL: CPT | Performed by: PHYSICIAN ASSISTANT

## 2021-07-14 PROCEDURE — 99285 EMERGENCY DEPT VISIT HI MDM: CPT

## 2021-07-14 PROCEDURE — 83880 ASSAY OF NATRIURETIC PEPTIDE: CPT | Performed by: PHYSICIAN ASSISTANT

## 2021-07-14 PROCEDURE — 93005 ELECTROCARDIOGRAM TRACING: CPT

## 2021-07-16 ENCOUNTER — TELEPHONE (OUTPATIENT)
Dept: CARDIOLOGY | Facility: CLINIC | Age: 56
End: 2021-07-16

## 2021-07-20 ENCOUNTER — HOSPITAL ENCOUNTER (OUTPATIENT)
Dept: CARDIOLOGY | Facility: HOSPITAL | Age: 56
Discharge: HOME | End: 2021-07-20
Attending: INTERNAL MEDICINE
Payer: COMMERCIAL

## 2021-07-20 VITALS
HEIGHT: 68 IN | WEIGHT: 145 LBS | BODY MASS INDEX: 21.98 KG/M2 | DIASTOLIC BLOOD PRESSURE: 94 MMHG | SYSTOLIC BLOOD PRESSURE: 133 MMHG

## 2021-07-20 DIAGNOSIS — R00.2 PALPITATIONS: ICD-10-CM

## 2021-07-20 DIAGNOSIS — R55 SYNCOPE AND COLLAPSE: ICD-10-CM

## 2021-07-20 LAB
AORTIC ROOT ANNULUS: 3.51 CM
AORTIC VALVE CUSP SEPERATION: 2.14 CM
AV INDEX (PROSTH): 0.74
AV MEAN GRADIENT: 2 MMHG
AV PEAK GRADIENT: 4 MMHG
AV VALVE AREA: 2.74 CM2
AV VELOCITY RATIO: 0.85
BSA FOR ECHO PROCEDURE: 1.78 M2
CV ECHO LV RWT: 0.46 CM
DOP CALC AO PEAK VEL: 1.04 M/S
DOP CALC AO VTI: 18.45 CM
DOP CALC LVOT AREA: 3.7 CM2
DOP CALC LVOT DIAMETER: 2.17 CM
DOP CALC LVOT PEAK VEL: 0.88 M/S
DOP CALC LVOT STROKE VOLUME: 50.6 CM3
DOP CALCLVOT PEAK VEL VTI: 13.69 CM
E WAVE DECELERATION TIME: 293.46 MSEC
E/A RATIO: 1.12
E/E' RATIO: 5.5 M/S
ECHO LV POSTERIOR WALL: 0.91 CM (ref 0.6–1.1)
EJECTION FRACTION: 60 %
FRACTIONAL SHORTENING: 44 % (ref 28–44)
INTERVENTRICULAR SEPTUM: 0.98 CM (ref 0.6–1.1)
IVRT: 108.42 MSEC
LA MAJOR: 3.09 CM
LA MINOR: 3.48 CM
LA WIDTH: 3.21 CM
LEFT ATRIUM SIZE: 2.63 CM
LEFT ATRIUM VOLUME INDEX: 13.2 ML/M2
LEFT ATRIUM VOLUME: 23.49 CM3
LEFT INTERNAL DIMENSION IN SYSTOLE: 2.18 CM (ref 2.1–4)
LEFT VENTRICLE DIASTOLIC VOLUME INDEX: 37.43 ML/M2
LEFT VENTRICLE DIASTOLIC VOLUME: 66.62 ML
LEFT VENTRICLE MASS INDEX: 64 G/M2
LEFT VENTRICLE SYSTOLIC VOLUME INDEX: 8.9 ML/M2
LEFT VENTRICLE SYSTOLIC VOLUME: 15.77 ML
LEFT VENTRICULAR INTERNAL DIMENSION IN DIASTOLE: 3.92 CM (ref 3.5–6)
LEFT VENTRICULAR MASS: 113.66 G
LV LATERAL E/E' RATIO: 4.58 M/S
LV SEPTAL E/E' RATIO: 6.88 M/S
MV PEAK A VEL: 0.49 M/S
MV PEAK E VEL: 0.55 M/S
PISA TR MAX VEL: 1.82 M/S
PV PEAK VELOCITY: 0.82 CM/S
RA MAJOR: 3.83 CM
RA WIDTH: 3.47 CM
RIGHT VENTRICULAR END-DIASTOLIC DIMENSION: 2.9 CM
RV TISSUE DOPPLER FREE WALL SYSTOLIC VELOCITY 1 (APICAL 4 CHAMBER VIEW): 9.27 CM/S
SINUS: 4.04 CM
STJ: 3.16 CM
TDI LATERAL: 0.12 M/S
TDI SEPTAL: 0.08 M/S
TDI: 0.1 M/S
TR MAX PG: 13 MMHG
TRICUSPID ANNULAR PLANE SYSTOLIC EXCURSION: 1.76 CM

## 2021-07-20 PROCEDURE — 93225 XTRNL ECG REC<48 HRS REC: CPT

## 2021-07-20 PROCEDURE — 93227 XTRNL ECG REC<48 HR R&I: CPT | Mod: ,,, | Performed by: INTERNAL MEDICINE

## 2021-07-20 PROCEDURE — 93227 HOLTER MONITOR - 24 HOUR (CUPID ONLY): ICD-10-PCS | Mod: ,,, | Performed by: INTERNAL MEDICINE

## 2021-07-20 PROCEDURE — 93306 ECHO (CUPID ONLY): ICD-10-PCS | Mod: 26,,, | Performed by: INTERNAL MEDICINE

## 2021-07-20 PROCEDURE — 93306 TTE W/DOPPLER COMPLETE: CPT

## 2021-07-20 PROCEDURE — 93306 TTE W/DOPPLER COMPLETE: CPT | Mod: 26,,, | Performed by: INTERNAL MEDICINE

## 2021-07-22 LAB
OHS CV EVENT MONITOR DAY: 0
OHS CV HOLTER LENGTH DECIMAL HOURS: 23.98
OHS CV HOLTER LENGTH HOURS: 23
OHS CV HOLTER LENGTH MINUTES: 59

## 2021-07-28 ENCOUNTER — NURSE TRIAGE (OUTPATIENT)
Dept: ADMINISTRATIVE | Facility: CLINIC | Age: 56
End: 2021-07-28

## 2021-07-28 ENCOUNTER — TELEPHONE (OUTPATIENT)
Dept: CARDIOLOGY | Facility: CLINIC | Age: 56
End: 2021-07-28

## 2021-07-28 ENCOUNTER — OFFICE VISIT (OUTPATIENT)
Dept: CARDIOLOGY | Facility: CLINIC | Age: 56
End: 2021-07-28
Payer: COMMERCIAL

## 2021-07-28 VITALS
HEIGHT: 68 IN | OXYGEN SATURATION: 97 % | WEIGHT: 138.88 LBS | SYSTOLIC BLOOD PRESSURE: 104 MMHG | HEART RATE: 78 BPM | DIASTOLIC BLOOD PRESSURE: 82 MMHG | BODY MASS INDEX: 21.05 KG/M2

## 2021-07-28 DIAGNOSIS — R07.89 CHEST PAIN, ATYPICAL: ICD-10-CM

## 2021-07-28 DIAGNOSIS — R55 SYNCOPE AND COLLAPSE: ICD-10-CM

## 2021-07-28 DIAGNOSIS — R00.2 PALPITATIONS: Primary | ICD-10-CM

## 2021-07-28 PROCEDURE — 99999 PR PBB SHADOW E&M-EST. PATIENT-LVL III: CPT | Mod: PBBFAC,,, | Performed by: INTERNAL MEDICINE

## 2021-07-28 PROCEDURE — 99999 PR PBB SHADOW E&M-EST. PATIENT-LVL III: ICD-10-PCS | Mod: PBBFAC,,, | Performed by: INTERNAL MEDICINE

## 2021-07-28 PROCEDURE — 3008F BODY MASS INDEX DOCD: CPT | Mod: CPTII,S$GLB,, | Performed by: INTERNAL MEDICINE

## 2021-07-28 PROCEDURE — 1126F AMNT PAIN NOTED NONE PRSNT: CPT | Mod: CPTII,S$GLB,, | Performed by: INTERNAL MEDICINE

## 2021-07-28 PROCEDURE — 3008F PR BODY MASS INDEX (BMI) DOCUMENTED: ICD-10-PCS | Mod: CPTII,S$GLB,, | Performed by: INTERNAL MEDICINE

## 2021-07-28 PROCEDURE — 99214 OFFICE O/P EST MOD 30 MIN: CPT | Mod: S$GLB,,, | Performed by: INTERNAL MEDICINE

## 2021-07-28 PROCEDURE — 99214 PR OFFICE/OUTPT VISIT, EST, LEVL IV, 30-39 MIN: ICD-10-PCS | Mod: S$GLB,,, | Performed by: INTERNAL MEDICINE

## 2021-07-28 PROCEDURE — 1159F MED LIST DOCD IN RCRD: CPT | Mod: CPTII,S$GLB,, | Performed by: INTERNAL MEDICINE

## 2021-07-28 PROCEDURE — 1126F PR PAIN SEVERITY QUANTIFIED, NO PAIN PRESENT: ICD-10-PCS | Mod: CPTII,S$GLB,, | Performed by: INTERNAL MEDICINE

## 2021-07-28 PROCEDURE — 1159F PR MEDICATION LIST DOCUMENTED IN MEDICAL RECORD: ICD-10-PCS | Mod: CPTII,S$GLB,, | Performed by: INTERNAL MEDICINE

## 2021-08-03 ENCOUNTER — HOSPITAL ENCOUNTER (OUTPATIENT)
Dept: CARDIOLOGY | Facility: HOSPITAL | Age: 56
Discharge: HOME | End: 2021-08-03
Attending: INTERNAL MEDICINE
Payer: COMMERCIAL

## 2021-08-03 DIAGNOSIS — R07.89 CHEST PAIN, ATYPICAL: ICD-10-CM

## 2021-08-03 DIAGNOSIS — R00.2 PALPITATIONS: ICD-10-CM

## 2021-08-03 DIAGNOSIS — R55 SYNCOPE AND COLLAPSE: ICD-10-CM

## 2021-08-03 LAB
CV STRESS BASE HR: 59 BPM
DIASTOLIC BLOOD PRESSURE: 79 MMHG
OHS CV CPX 1 MINUTE RECOVERY HEART RATE: 126 BPM
OHS CV CPX 85 PERCENT MAX PREDICTED HEART RATE MALE: 139
OHS CV CPX ESTIMATED METS: 13
OHS CV CPX MAX PREDICTED HEART RATE: 164
OHS CV CPX PATIENT IS FEMALE: 0
OHS CV CPX PATIENT IS MALE: 1
OHS CV CPX PEAK DIASTOLIC BLOOD PRESSURE: 63 MMHG
OHS CV CPX PEAK HEAR RATE: 141 BPM
OHS CV CPX PEAK RATE PRESSURE PRODUCT: NORMAL
OHS CV CPX PEAK SYSTOLIC BLOOD PRESSURE: 143 MMHG
OHS CV CPX PERCENT MAX PREDICTED HEART RATE ACHIEVED: 86
OHS CV CPX RATE PRESSURE PRODUCT PRESENTING: 6431
STRESS ECHO POST EXERCISE DUR MIN: 10 MINUTES
STRESS ECHO POST EXERCISE DUR SEC: 57 SECONDS
SYSTOLIC BLOOD PRESSURE: 109 MMHG

## 2021-08-03 PROCEDURE — 93016 EXERCISE STRESS - EKG (CUPID ONLY): ICD-10-PCS | Mod: ,,, | Performed by: INTERNAL MEDICINE

## 2021-08-03 PROCEDURE — 93018 EXERCISE STRESS - EKG (CUPID ONLY): ICD-10-PCS | Mod: ,,, | Performed by: INTERNAL MEDICINE

## 2021-08-03 PROCEDURE — 93018 CV STRESS TEST I&R ONLY: CPT | Mod: ,,, | Performed by: INTERNAL MEDICINE

## 2021-08-03 PROCEDURE — 93016 CV STRESS TEST SUPVJ ONLY: CPT | Mod: ,,, | Performed by: INTERNAL MEDICINE

## 2021-08-13 ENCOUNTER — CLINICAL SUPPORT (OUTPATIENT)
Dept: CARDIOLOGY | Facility: HOSPITAL | Age: 56
End: 2021-08-13
Attending: INTERNAL MEDICINE
Payer: COMMERCIAL

## 2021-08-13 DIAGNOSIS — R55 SYNCOPE AND COLLAPSE: ICD-10-CM

## 2021-08-13 DIAGNOSIS — R07.89 CHEST PAIN, ATYPICAL: ICD-10-CM

## 2021-08-13 DIAGNOSIS — R00.2 PALPITATIONS: ICD-10-CM

## 2021-08-13 PROCEDURE — 93272 ECG/REVIEW INTERPRET ONLY: CPT | Mod: ,,, | Performed by: INTERNAL MEDICINE

## 2021-08-13 PROCEDURE — 93272 CARDIAC EVENT MONITOR (CUPID ONLY): ICD-10-PCS | Mod: ,,, | Performed by: INTERNAL MEDICINE

## 2021-08-13 PROCEDURE — 93271 ECG/MONITORING AND ANALYSIS: CPT

## 2021-09-29 ENCOUNTER — PATIENT OUTREACH (OUTPATIENT)
Dept: ADMINISTRATIVE | Facility: OTHER | Age: 56
End: 2021-09-29

## 2021-09-30 ENCOUNTER — OFFICE VISIT (OUTPATIENT)
Dept: CARDIOLOGY | Facility: CLINIC | Age: 56
End: 2021-09-30
Payer: COMMERCIAL

## 2021-09-30 VITALS
WEIGHT: 141 LBS | HEIGHT: 68 IN | HEART RATE: 78 BPM | SYSTOLIC BLOOD PRESSURE: 128 MMHG | OXYGEN SATURATION: 96 % | BODY MASS INDEX: 21.37 KG/M2 | DIASTOLIC BLOOD PRESSURE: 76 MMHG

## 2021-09-30 DIAGNOSIS — R07.89 CHEST PAIN, ATYPICAL: ICD-10-CM

## 2021-09-30 DIAGNOSIS — R00.2 PALPITATIONS: Primary | ICD-10-CM

## 2021-09-30 DIAGNOSIS — R55 SYNCOPE AND COLLAPSE: ICD-10-CM

## 2021-09-30 PROCEDURE — 1159F PR MEDICATION LIST DOCUMENTED IN MEDICAL RECORD: ICD-10-PCS | Mod: CPTII,S$GLB,, | Performed by: INTERNAL MEDICINE

## 2021-09-30 PROCEDURE — 3074F PR MOST RECENT SYSTOLIC BLOOD PRESSURE < 130 MM HG: ICD-10-PCS | Mod: CPTII,S$GLB,, | Performed by: INTERNAL MEDICINE

## 2021-09-30 PROCEDURE — 3008F BODY MASS INDEX DOCD: CPT | Mod: CPTII,S$GLB,, | Performed by: INTERNAL MEDICINE

## 2021-09-30 PROCEDURE — 99999 PR PBB SHADOW E&M-EST. PATIENT-LVL III: ICD-10-PCS | Mod: PBBFAC,,, | Performed by: INTERNAL MEDICINE

## 2021-09-30 PROCEDURE — 3008F PR BODY MASS INDEX (BMI) DOCUMENTED: ICD-10-PCS | Mod: CPTII,S$GLB,, | Performed by: INTERNAL MEDICINE

## 2021-09-30 PROCEDURE — 99999 PR PBB SHADOW E&M-EST. PATIENT-LVL III: CPT | Mod: PBBFAC,,, | Performed by: INTERNAL MEDICINE

## 2021-09-30 PROCEDURE — 3074F SYST BP LT 130 MM HG: CPT | Mod: CPTII,S$GLB,, | Performed by: INTERNAL MEDICINE

## 2021-09-30 PROCEDURE — 3078F PR MOST RECENT DIASTOLIC BLOOD PRESSURE < 80 MM HG: ICD-10-PCS | Mod: CPTII,S$GLB,, | Performed by: INTERNAL MEDICINE

## 2021-09-30 PROCEDURE — 99214 PR OFFICE/OUTPT VISIT, EST, LEVL IV, 30-39 MIN: ICD-10-PCS | Mod: S$GLB,,, | Performed by: INTERNAL MEDICINE

## 2021-09-30 PROCEDURE — 99214 OFFICE O/P EST MOD 30 MIN: CPT | Mod: S$GLB,,, | Performed by: INTERNAL MEDICINE

## 2021-09-30 PROCEDURE — 1159F MED LIST DOCD IN RCRD: CPT | Mod: CPTII,S$GLB,, | Performed by: INTERNAL MEDICINE

## 2021-09-30 PROCEDURE — 3078F DIAST BP <80 MM HG: CPT | Mod: CPTII,S$GLB,, | Performed by: INTERNAL MEDICINE

## 2022-06-24 ENCOUNTER — HOSPITAL ENCOUNTER (EMERGENCY)
Facility: HOSPITAL | Age: 57
Discharge: HOME | End: 2022-06-24
Attending: EMERGENCY MEDICINE
Payer: COMMERCIAL

## 2022-06-24 VITALS
OXYGEN SATURATION: 98 % | RESPIRATION RATE: 18 BRPM | BODY MASS INDEX: 21.29 KG/M2 | WEIGHT: 140 LBS | HEART RATE: 80 BPM | DIASTOLIC BLOOD PRESSURE: 78 MMHG | TEMPERATURE: 98 F | SYSTOLIC BLOOD PRESSURE: 130 MMHG

## 2022-06-24 DIAGNOSIS — M25.511 RIGHT SHOULDER PAIN: Primary | ICD-10-CM

## 2022-06-24 PROCEDURE — 99284 EMERGENCY DEPT VISIT MOD MDM: CPT

## 2022-06-24 PROCEDURE — 63600175 PHARM REV CODE 636 W HCPCS: Performed by: NURSE PRACTITIONER

## 2022-06-24 PROCEDURE — 96372 THER/PROPH/DIAG INJ SC/IM: CPT | Performed by: NURSE PRACTITIONER

## 2022-06-24 RX ORDER — TIZANIDINE 2 MG/1
2 TABLET ORAL EVERY 8 HOURS PRN
Qty: 15 TABLET | Refills: 0 | Status: SHIPPED | OUTPATIENT
Start: 2022-06-24 | End: 2022-10-15

## 2022-06-24 RX ORDER — KETOROLAC TROMETHAMINE 30 MG/ML
30 INJECTION, SOLUTION INTRAMUSCULAR; INTRAVENOUS
Status: COMPLETED | OUTPATIENT
Start: 2022-06-24 | End: 2022-06-24

## 2022-06-24 RX ORDER — NAPROXEN 500 MG/1
500 TABLET ORAL EVERY 12 HOURS PRN
Qty: 20 TABLET | Refills: 0 | Status: SHIPPED | OUTPATIENT
Start: 2022-06-24 | End: 2022-10-15

## 2022-06-24 RX ADMIN — KETOROLAC TROMETHAMINE 30 MG: 30 INJECTION, SOLUTION INTRAMUSCULAR at 09:06

## 2022-06-24 NOTE — DISCHARGE INSTRUCTIONS

## 2022-06-24 NOTE — ED TRIAGE NOTES
Patient presents to ED c/o Shoulder Pain, Pt reporting right shoulder pain. Pt has a hx of shoulder injury 2 years ago. 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 Patient reports taking Advil this morning for pain.     Denies trauma, fever, chills, cough, n/v/d    AAO x 4.

## 2022-06-24 NOTE — ED PROVIDER NOTES
Encounter Date: 6/24/2022    SCRIBE #1 NOTE: I, Joe Polk, am scribing for, and in the presence of,  Papito Echevarria NP. I have scribed the following portions of the note - Other sections scribed: HPI & ROS.       History     Chief Complaint   Patient presents with    Shoulder Pain     Pt reporting right shoulder pain. Pt has a hx of shoulder injury 2 years ago. 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      Jamin Singleton Jr. is a 57 y.o. 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 Patient reports Hx of chronic right shoulder pain since 2005 and endorses recurring pain and muscle knots to area. States pain is exacerbated with movement and reports inability to raise arm completely. Reports taking Advil. Patient denies fever, chills, N/V/D, or other associated symptoms.       The history is provided by the patient. No  was used.     Review of patient's allergies indicates:  No Known Allergies  Past Medical History:   Diagnosis Date    Crohn's disease     pt reported    Degenerative disk disease     Disc disease, degenerative, thoracic or thoracolumbar     DJD (degenerative joint disease)      Past Surgical History:   Procedure Laterality Date    KNEE SURGERY  2003     History reviewed. No pertinent family history.  Social History     Tobacco Use    Smoking status: Current Every Day Smoker     Packs/day: 0.50     Types: Cigarettes    Smokeless tobacco: Never Used   Substance Use Topics    Alcohol use: Yes     Comment: occasionally    Drug use: No     Review of Systems   Constitutional: Negative for chills and fever.   HENT: Negative for sore throat.    Eyes: Negative for visual disturbance.   Respiratory: Negative for cough and shortness of breath.    Cardiovascular: Negative for chest pain.    Gastrointestinal: Negative for abdominal pain, diarrhea, nausea and vomiting.   Genitourinary: Negative for dysuria.   Musculoskeletal: Positive for arthralgias (right shoulder). Negative for back pain.   Skin: Negative for rash.   Neurological: Negative for headaches.       Physical Exam     Initial Vitals [06/24/22 0750]   BP Pulse Resp Temp SpO2   131/77 82 18 98.4 °F (36.9 °C) 96 %      MAP       --         Physical Exam    Nursing note and vitals reviewed.  Constitutional: He appears well-developed and well-nourished. He is not diaphoretic. No distress.   HENT:   Head: Normocephalic and atraumatic.   Right Ear: External ear normal.   Left Ear: External ear normal.   Nose: Nose normal.   Eyes: EOM are normal. Right eye exhibits no discharge. Left eye exhibits no discharge.   Neck: Neck supple. No tracheal deviation present.   Normal range of motion.  Cardiovascular: Normal rate.   Pulmonary/Chest: No stridor. No respiratory distress.   Abdominal: Abdomen is soft. He exhibits no distension. There is no abdominal tenderness.   Musculoskeletal:         General: No tenderness. Normal range of motion.      Cervical back: Normal range of motion and neck supple.      Comments: Generalized nonfocal tenderness to the right superior and posterior shoulder area.  No erythema, warmth, induration, rash, swelling, deformity, or other associated abnormality.  Ipsilateral radial pulse 2 +.  Compartments are soft.  Active range of motion of the right shoulder fully intact but painful.     Neurological: He is alert and oriented to person, place, and time. He has normal strength. No cranial nerve deficit.   Skin: Skin is warm and dry.   Psychiatric: He has a normal mood and affect. His behavior is normal. Judgment and thought content normal.         ED Course   Procedures  Labs Reviewed - No data to display       Imaging Results          X-Ray Shoulder Trauma Right (Final result)  Result time 06/24/22 09:09:47    Final result by  Maninder Lehman MD (06/24/22 09:09:47)                 Impression:      1. No acute displaced fracture or dislocation of the right shoulder.      Electronically signed by: Maninder Lehman MD  Date:    06/24/2022  Time:    09:09             Narrative:    EXAMINATION:  XR SHOULDER TRAUMA 3 VIEW RIGHT    CLINICAL HISTORY:  Pain in right shoulder    TECHNIQUE:  Three or four views of the right shoulder were performed.    COMPARISON:  04/23/2020    FINDINGS:  Three views right shoulder.    The right humeral head maintains appropriate relationship with the glenoid.  The acromioclavicular joint is intact.  There is calcific tendinosis.  No acute displaced right rib fracture.  The right lung zones are clear.                                 Medications   ketorolac injection 30 mg (30 mg Intramuscular Given 6/24/22 0904)     Medical Decision Making:   History:   Old Medical Records: I decided to obtain old medical records.  Clinical Tests:   Radiological Study: Ordered and Reviewed  ED Management:  Physical exam without evidence of infectious process or neurovascular compromise.  X-rays without evidence of fracture, dislocation, or other acute abnormality.  Does report acute on chronic right shoulder pain likely exacerbated in this case by reported heavy lifting for the past several days while at work.  Will treat with a sling for comfort as well as NSAIDs and muscle relaxers.  No evidence of emergent pathology at this time.  Advised patient to follow-up with his PCP or Orthopedics.  ED return precautions given.  Patient expressed understanding.          Scribe Attestation:   Scribe #1: I performed the above scribed service and the documentation accurately describes the services I performed. I attest to the accuracy of the note.                 Clinical Impression:   Final diagnoses:  [M25.511] Right shoulder pain (Primary)       IPapito NP, personally performed the services described in this documentation. All  medical record entries made by the scribe were at my direction and in my presence. I have reviewed the chart and agree that the record reflects my personal performance and is accurate and complete.   ED Disposition Condition    Discharge Stable        ED Prescriptions     Medication Sig Dispense Start Date End Date Auth. Provider    naproxen (NAPROSYN) 500 MG tablet Take 1 tablet (500 mg total) by mouth every 12 (twelve) hours as needed (Pain). 20 tablet 6/24/2022  Papito Echevarria NP    tiZANidine (ZANAFLEX) 2 MG tablet Take 1 tablet (2 mg total) by mouth every 8 (eight) hours as needed (Muscle Spasms). 15 tablet 6/24/2022  Papito Echevarria NP        Follow-up Information     Follow up With Specialties Details Why Contact Info    Jose Montanez MD Family Medicine, Wound Care Schedule an appointment as soon as possible for a visit  For further evaluation [address] [facility] [address]  [phone]      [facility] Emergency Medicine Go to  If symptoms worsen, As needed [address] [facility] [address] [phone]           Papito Echevarria NP  06/24/22 7327

## 2022-06-24 NOTE — Clinical Note
"Jamin Ren" Sapello was seen and treated in our emergency department on 6/24/2022.  He may return to work on 06/27/2022.       If you have any questions or concerns, please don't hesitate to call.      Papito Echevarria NP"

## 2022-10-15 ENCOUNTER — OFFICE VISIT (OUTPATIENT)
Dept: FAMILY MEDICINE | Facility: CLINIC | Age: 57
End: 2022-10-15
Payer: COMMERCIAL

## 2022-10-15 VITALS
TEMPERATURE: 98 F | HEART RATE: 72 BPM | DIASTOLIC BLOOD PRESSURE: 74 MMHG | BODY MASS INDEX: 20.92 KG/M2 | SYSTOLIC BLOOD PRESSURE: 112 MMHG | RESPIRATION RATE: 16 BRPM | WEIGHT: 138 LBS | OXYGEN SATURATION: 98 % | HEIGHT: 68 IN

## 2022-10-15 DIAGNOSIS — G89.29 CHRONIC RIGHT SHOULDER PAIN: Primary | ICD-10-CM

## 2022-10-15 DIAGNOSIS — M25.511 CHRONIC RIGHT SHOULDER PAIN: Primary | ICD-10-CM

## 2022-10-15 DIAGNOSIS — Z13.220 SCREENING FOR HYPERLIPIDEMIA: ICD-10-CM

## 2022-10-15 DIAGNOSIS — R53.83 FATIGUE, UNSPECIFIED TYPE: ICD-10-CM

## 2022-10-15 PROCEDURE — 99214 OFFICE O/P EST MOD 30 MIN: CPT | Mod: S$GLB,,, | Performed by: FAMILY MEDICINE

## 2022-10-15 PROCEDURE — 1159F PR MEDICATION LIST DOCUMENTED IN MEDICAL RECORD: ICD-10-PCS | Mod: CPTII,S$GLB,, | Performed by: FAMILY MEDICINE

## 2022-10-15 PROCEDURE — 99214 PR OFFICE/OUTPT VISIT, EST, LEVL IV, 30-39 MIN: ICD-10-PCS | Mod: S$GLB,,, | Performed by: FAMILY MEDICINE

## 2022-10-15 PROCEDURE — 99999 PR PBB SHADOW E&M-EST. PATIENT-LVL IV: CPT | Mod: PBBFAC,,, | Performed by: FAMILY MEDICINE

## 2022-10-15 PROCEDURE — 99999 PR PBB SHADOW E&M-EST. PATIENT-LVL IV: ICD-10-PCS | Mod: PBBFAC,,, | Performed by: FAMILY MEDICINE

## 2022-10-15 PROCEDURE — 1159F MED LIST DOCD IN RCRD: CPT | Mod: CPTII,S$GLB,, | Performed by: FAMILY MEDICINE

## 2022-10-15 PROCEDURE — 3074F SYST BP LT 130 MM HG: CPT | Mod: CPTII,S$GLB,, | Performed by: FAMILY MEDICINE

## 2022-10-15 PROCEDURE — 3074F PR MOST RECENT SYSTOLIC BLOOD PRESSURE < 130 MM HG: ICD-10-PCS | Mod: CPTII,S$GLB,, | Performed by: FAMILY MEDICINE

## 2022-10-15 PROCEDURE — 3078F DIAST BP <80 MM HG: CPT | Mod: CPTII,S$GLB,, | Performed by: FAMILY MEDICINE

## 2022-10-15 PROCEDURE — 3078F PR MOST RECENT DIASTOLIC BLOOD PRESSURE < 80 MM HG: ICD-10-PCS | Mod: CPTII,S$GLB,, | Performed by: FAMILY MEDICINE

## 2022-10-15 RX ORDER — CYCLOBENZAPRINE HCL 5 MG
5 TABLET ORAL 3 TIMES DAILY PRN
Qty: 30 TABLET | Refills: 0 | Status: SHIPPED | OUTPATIENT
Start: 2022-10-15 | End: 2022-10-26

## 2022-10-15 RX ORDER — MELOXICAM 15 MG/1
15 TABLET ORAL DAILY
Qty: 30 TABLET | Refills: 2 | Status: ON HOLD | OUTPATIENT
Start: 2022-10-15 | End: 2022-11-11 | Stop reason: HOSPADM

## 2022-10-15 NOTE — PROGRESS NOTES
Chief Complaint   Patient presents with    Shoulder Pain       HPI  Jamin Singleton Jr. is a 57 y.o. male with multiple medical diagnoses as listed in the medical history and problem list that presents for evaluation for right shoulder pain    Shoulder injury  This has been since 2005 when he had a fall, it waxes and wanes, has been worse over the past month  He is having pain with lifting his arm  He has numbness in both hands  Has had x rays but has not seen ortho, was not able to afford MRI      ALLERGIES AND MEDICATIONS: updated and reviewed.  Review of patient's allergies indicates:  No Known Allergies  Medication List with Changes/Refills   New Medications    CYCLOBENZAPRINE (FLEXERIL) 5 MG TABLET    Take 1 tablet (5 mg total) by mouth 3 (three) times daily as needed for Muscle spasms.    MELOXICAM (MOBIC) 15 MG TABLET    Take 1 tablet (15 mg total) by mouth once daily.   Current Medications    ONDANSETRON (ZOFRAN-ODT) 8 MG TBDL    DISSOLVE 1 TABLET(8 MG) ON THE TONGUE EVERY 8 HOURS AS NEEDED FOR NAUSEA   Discontinued Medications    ALUMINUM & MAGNESIUM HYDROXIDE-SIMETHICONE (MAALOX MAXIMUM STRENGTH) 400-400-40 MG/5 ML SUSPENSION    Take 10 mLs by mouth every 6 (six) hours as needed for Indigestion.    ATORVASTATIN (LIPITOR) 20 MG TABLET    Take 1 tablet (20 mg total) by mouth once daily.    GABAPENTIN (NEURONTIN) 300 MG CAPSULE        HYDROXYZINE HCL (ATARAX) 25 MG TABLET    Take 1 tablet (25 mg total) by mouth 3 (three) times daily.    NAPROXEN (NAPROSYN) 500 MG TABLET    Take 1 tablet (500 mg total) by mouth every 12 (twelve) hours as needed (Pain).    OXYCODONE-ACETAMINOPHEN (PERCOCET) 5-325 MG PER TABLET    Take 1 tablet by mouth every 6 (six) hours as needed (Severe/breakthrough pain).    PANTOPRAZOLE (PROTONIX) 20 MG TABLET    Take 2 tablets (40 mg total) by mouth once daily.    PREDNISONE (DELTASONE) 10 MG TABLET    Take 4 tabs x 3 days, then  Take 2 tabs x 3 days, then   Take 1 tab x 3 days.     "TIZANIDINE (ZANAFLEX) 2 MG TABLET    Take 1 tablet (2 mg total) by mouth every 8 (eight) hours as needed (Muscle Spasms).       ROS  Review of Systems   Constitutional:  Positive for fatigue. Negative for chills, fever and unexpected weight change.   HENT:  Negative for ear pain, postnasal drip, rhinorrhea, sinus pressure and sore throat.    Eyes:  Negative for photophobia and visual disturbance.   Respiratory:  Negative for apnea, cough, chest tightness, shortness of breath and wheezing.    Cardiovascular:  Negative for chest pain and palpitations.   Gastrointestinal:  Negative for abdominal pain, blood in stool, constipation, diarrhea, nausea and vomiting.   Genitourinary:  Negative for difficulty urinating.   Musculoskeletal:  Positive for arthralgias. Negative for joint swelling.   Skin:  Negative for rash.   Neurological:  Negative for facial asymmetry, speech difficulty, weakness, numbness and headaches.   Psychiatric/Behavioral:  Negative for dysphoric mood.      Physical Exam  Vitals:    10/15/22 0959   BP: 112/74   Pulse: 72   Resp: 16   Temp: 98.4 °F (36.9 °C)   TempSrc: Oral   SpO2: 98%   Weight: 62.6 kg (138 lb 0.1 oz)   Height: 5' 8" (1.727 m)    Body mass index is 20.98 kg/m².  Weight: 62.6 kg (138 lb 0.1 oz)   Height: 5' 8" (172.7 cm)     Physical Exam  Vitals and nursing note reviewed.   Constitutional:       Appearance: He is well-developed.   HENT:      Head: Normocephalic and atraumatic.   Musculoskeletal:        Arms:       Comments: C-spine w/o bony TTP    Right shoulder: ROM approx 95 degrees adduction, pain with ratliff, unable to perform apley and can cross his arm but not fully   strength symmetric bilaterally DTRs symmetric bilaterally   Skin:     General: Skin is warm and dry.      Findings: No rash.   Neurological:      Mental Status: He is alert. Mental status is at baseline.   Psychiatric:         Behavior: Behavior normal.       Health Maintenance         Date Due Completion Date "    Pneumococcal Vaccines (Age 0-64) (1 - PCV) Never done ---    HIV Screening Never done ---    Colorectal Cancer Screening Never done ---    Shingles Vaccine (1 of 2) Never done ---    COVID-19 Vaccine (4 - Booster for Pfizer series) 03/08/2022 1/11/2022    Influenza Vaccine (1) Never done ---    Lipid Panel 02/04/2026 2/4/2021    TETANUS VACCINE 03/25/2031 3/25/2021            Health maintenance reviewed and addressed as ordered      ASSESSMENT     1. Chronic right shoulder pain    2. Fatigue, unspecified type    3. Screening for hyperlipidemia        PLAN:     Problem List Items Addressed This Visit    None  Visit Diagnoses       Chronic right shoulder pain    -  Primary  Refer to ortho for rotator cuff eval  MRI-pt given info for pricing phone number  May resume neurontin but it does make him groggy    Relevant Medications    cyclobenzaprine (FLEXERIL) 5 MG tablet    meloxicam (MOBIC) 15 MG tablet    Other Relevant Orders    Ambulatory referral/consult to Orthopedics    MRI Shoulder Without Contrast Right    Comprehensive Metabolic Panel    Fatigue, unspecified type      Will get fasting labs  Recommend he f/u with PCP for annual physical    Relevant Orders    CBC Auto Differential    Screening for hyperlipidemia        Relevant Orders    Lipid Panel              Betzaida Meyer MD  10/15/2022 10:01 AM      Follow up if symptoms worsen or fail to improve.

## 2022-10-15 NOTE — PATIENT INSTRUCTIONS
For more information, contact:   [employer]   [phone] or [phone]   [email or web address]

## 2022-10-15 NOTE — LETTER
October 15, 2022      [facility]  [address], [address]  [place] [address]  Phone: [phone]       Patient: Jamin Singleton   YOB: 1965  Date of Visit: 10/15/2022    To Whom It May Concern:    Tray Singleton  was at [facility] on 10/15/20/22 The patient may return to work/school  10/17/2022 no  Restrictions. Please excuse pt on 10/14/ 2022 If you have any questions or concerns, or if I can be of further assistance, please do not hesitate to contact me.    Sincerely,    Taty Regalado MA

## 2022-10-17 ENCOUNTER — TELEPHONE (OUTPATIENT)
Dept: FAMILY MEDICINE | Facility: CLINIC | Age: 57
End: 2022-10-17
Payer: COMMERCIAL

## 2022-10-17 NOTE — TELEPHONE ENCOUNTER
----- Message from Natalie Fletcher sent at 10/17/2022  9:32 AM CDT -----  Type: Patient Call Back    Who called: self    What is the request in detail: patient is requesting a return to work letter for tomorrow 10/18/2022. Please call    Can the clinic reply by [facility]? no    Would the patient rather a call back or a response via [facility]? call    Best call back number: .[phone] (home)

## 2022-10-17 NOTE — TELEPHONE ENCOUNTER
----- Message from Eryn Mason sent at 10/17/2022  9:29 AM CDT -----  .Type: Patient Call Back    Who called:CHERELLE    What is the request in detail: needs order for mri sent over to diagnostic imaging in [place]    Can the clinic reply by [facility]?    Would the patient rather a call back or a response via [facility]? call    Best call back number:  (DIS fax)

## 2022-10-17 NOTE — TELEPHONE ENCOUNTER
Please advise if a new rt to work letter can be provided with rt date 10/18 . Pt was seen on 10/15 .

## 2022-10-18 ENCOUNTER — TELEPHONE (OUTPATIENT)
Dept: FAMILY MEDICINE | Facility: CLINIC | Age: 57
End: 2022-10-18
Payer: COMMERCIAL

## 2022-10-18 NOTE — TELEPHONE ENCOUNTER
----- Message from Surya Garcia sent at 10/17/2022  3:53 PM CDT -----  Type: Patient Call Back    Who called:Self    What is the request in detail: Pt is calling regarding receiving a new return to work note. Pt will return on 10/18    Can the clinic reply by [facility]? no    Would the patient rather a call back or a response via [facility]? Call back    Best call back number:    Additional Information:

## 2022-10-18 NOTE — TELEPHONE ENCOUNTER
Revised a new letter , called pt and informed new letter was available . Said he spoke to to employer and letter previously given was fine .

## 2022-10-21 ENCOUNTER — LAB VISIT (OUTPATIENT)
Dept: LAB | Facility: HOSPITAL | Age: 57
End: 2022-10-21
Attending: FAMILY MEDICINE
Payer: COMMERCIAL

## 2022-10-21 DIAGNOSIS — M25.511 CHRONIC RIGHT SHOULDER PAIN: ICD-10-CM

## 2022-10-21 DIAGNOSIS — G89.29 CHRONIC RIGHT SHOULDER PAIN: ICD-10-CM

## 2022-10-21 DIAGNOSIS — Z13.220 SCREENING FOR HYPERLIPIDEMIA: ICD-10-CM

## 2022-10-21 DIAGNOSIS — R53.83 FATIGUE, UNSPECIFIED TYPE: ICD-10-CM

## 2022-10-21 LAB
ALBUMIN SERPL BCP-MCNC: 3.4 G/DL (ref 3.5–5.2)
ALP SERPL-CCNC: 63 U/L (ref 55–135)
ALT SERPL W/O P-5'-P-CCNC: 14 U/L (ref 10–44)
ANION GAP SERPL CALC-SCNC: 7 MMOL/L (ref 8–16)
AST SERPL-CCNC: 16 U/L (ref 10–40)
BASOPHILS # BLD AUTO: 0.17 K/UL (ref 0–0.2)
BASOPHILS NFR BLD: 2.1 % (ref 0–1.9)
BILIRUB SERPL-MCNC: 0.5 MG/DL (ref 0.1–1)
BUN SERPL-MCNC: 10 MG/DL (ref 6–20)
CALCIUM SERPL-MCNC: 8.6 MG/DL (ref 8.7–10.5)
CHLORIDE SERPL-SCNC: 100 MMOL/L (ref 95–110)
CHOLEST SERPL-MCNC: 156 MG/DL (ref 120–199)
CHOLEST/HDLC SERPL: 3.5 {RATIO} (ref 2–5)
CO2 SERPL-SCNC: 29 MMOL/L (ref 23–29)
CREAT SERPL-MCNC: 0.9 MG/DL (ref 0.5–1.4)
DIFFERENTIAL METHOD: ABNORMAL
EOSINOPHIL # BLD AUTO: 0.7 K/UL (ref 0–0.5)
EOSINOPHIL NFR BLD: 8.1 % (ref 0–8)
ERYTHROCYTE [DISTWIDTH] IN BLOOD BY AUTOMATED COUNT: 12.8 % (ref 11.5–14.5)
EST. GFR  (NO RACE VARIABLE): >60 ML/MIN/1.73 M^2
GLUCOSE SERPL-MCNC: 78 MG/DL (ref 70–110)
HCT VFR BLD AUTO: 38.4 % (ref 40–54)
HDLC SERPL-MCNC: 45 MG/DL (ref 40–75)
HDLC SERPL: 28.8 % (ref 20–50)
HGB BLD-MCNC: 12.9 G/DL (ref 14–18)
IMM GRANULOCYTES # BLD AUTO: 0.02 K/UL (ref 0–0.04)
IMM GRANULOCYTES NFR BLD AUTO: 0.2 % (ref 0–0.5)
LDLC SERPL CALC-MCNC: 99.8 MG/DL (ref 63–159)
LYMPHOCYTES # BLD AUTO: 1.7 K/UL (ref 1–4.8)
LYMPHOCYTES NFR BLD: 20.5 % (ref 18–48)
MCH RBC QN AUTO: 30.9 PG (ref 27–31)
MCHC RBC AUTO-ENTMCNC: 33.6 G/DL (ref 32–36)
MCV RBC AUTO: 92 FL (ref 82–98)
MONOCYTES # BLD AUTO: 0.9 K/UL (ref 0.3–1)
MONOCYTES NFR BLD: 10.9 % (ref 4–15)
NEUTROPHILS # BLD AUTO: 4.7 K/UL (ref 1.8–7.7)
NEUTROPHILS NFR BLD: 58.2 % (ref 38–73)
NONHDLC SERPL-MCNC: 111 MG/DL
NRBC BLD-RTO: 0 /100 WBC
PLATELET # BLD AUTO: 373 K/UL (ref 150–450)
PMV BLD AUTO: 8.9 FL (ref 9.2–12.9)
POTASSIUM SERPL-SCNC: 4.7 MMOL/L (ref 3.5–5.1)
PROT SERPL-MCNC: 6.5 G/DL (ref 6–8.4)
RBC # BLD AUTO: 4.18 M/UL (ref 4.6–6.2)
SODIUM SERPL-SCNC: 136 MMOL/L (ref 136–145)
TRIGL SERPL-MCNC: 56 MG/DL (ref 30–150)
WBC # BLD AUTO: 8.13 K/UL (ref 3.9–12.7)

## 2022-10-21 PROCEDURE — 36415 COLL VENOUS BLD VENIPUNCTURE: CPT | Mod: PN | Performed by: FAMILY MEDICINE

## 2022-10-21 PROCEDURE — 85025 COMPLETE CBC W/AUTO DIFF WBC: CPT | Performed by: FAMILY MEDICINE

## 2022-10-21 PROCEDURE — 80061 LIPID PANEL: CPT | Performed by: FAMILY MEDICINE

## 2022-10-21 PROCEDURE — 80053 COMPREHEN METABOLIC PANEL: CPT | Performed by: FAMILY MEDICINE

## 2022-10-25 DIAGNOSIS — M25.511 RIGHT SHOULDER PAIN, UNSPECIFIED CHRONICITY: Primary | ICD-10-CM

## 2022-10-26 ENCOUNTER — OFFICE VISIT (OUTPATIENT)
Dept: ORTHOPEDICS | Facility: CLINIC | Age: 57
End: 2022-10-26
Payer: COMMERCIAL

## 2022-10-26 ENCOUNTER — APPOINTMENT (OUTPATIENT)
Dept: RADIOLOGY | Facility: HOSPITAL | Age: 57
End: 2022-10-26
Attending: ORTHOPAEDIC SURGERY
Payer: COMMERCIAL

## 2022-10-26 DIAGNOSIS — M25.511 CHRONIC RIGHT SHOULDER PAIN: Primary | ICD-10-CM

## 2022-10-26 DIAGNOSIS — M25.511 RIGHT SHOULDER PAIN, UNSPECIFIED CHRONICITY: ICD-10-CM

## 2022-10-26 DIAGNOSIS — G89.29 CHRONIC RIGHT SHOULDER PAIN: Primary | ICD-10-CM

## 2022-10-26 PROCEDURE — 20610 LARGE JOINT ASPIRATION/INJECTION: R SUBACROMIAL BURSA: ICD-10-PCS | Mod: RT,S$GLB,, | Performed by: ORTHOPAEDIC SURGERY

## 2022-10-26 PROCEDURE — 73030 X-RAY EXAM OF SHOULDER: CPT | Mod: TC,FY,PN,RT

## 2022-10-26 PROCEDURE — 99999 PR PBB SHADOW E&M-EST. PATIENT-LVL III: ICD-10-PCS | Mod: PBBFAC,,, | Performed by: ORTHOPAEDIC SURGERY

## 2022-10-26 PROCEDURE — 20610 DRAIN/INJ JOINT/BURSA W/O US: CPT | Mod: RT,S$GLB,, | Performed by: ORTHOPAEDIC SURGERY

## 2022-10-26 PROCEDURE — 99204 OFFICE O/P NEW MOD 45 MIN: CPT | Mod: 25,S$GLB,, | Performed by: ORTHOPAEDIC SURGERY

## 2022-10-26 PROCEDURE — 1159F PR MEDICATION LIST DOCUMENTED IN MEDICAL RECORD: ICD-10-PCS | Mod: CPTII,S$GLB,, | Performed by: ORTHOPAEDIC SURGERY

## 2022-10-26 PROCEDURE — 1160F RVW MEDS BY RX/DR IN RCRD: CPT | Mod: CPTII,S$GLB,, | Performed by: ORTHOPAEDIC SURGERY

## 2022-10-26 PROCEDURE — 99204 PR OFFICE/OUTPT VISIT, NEW, LEVL IV, 45-59 MIN: ICD-10-PCS | Mod: 25,S$GLB,, | Performed by: ORTHOPAEDIC SURGERY

## 2022-10-26 PROCEDURE — 73030 XR SHOULDER TRAUMA 3 VIEW RIGHT: ICD-10-PCS | Mod: 26,RT,, | Performed by: RADIOLOGY

## 2022-10-26 PROCEDURE — 99999 PR PBB SHADOW E&M-EST. PATIENT-LVL III: CPT | Mod: PBBFAC,,, | Performed by: ORTHOPAEDIC SURGERY

## 2022-10-26 PROCEDURE — 1159F MED LIST DOCD IN RCRD: CPT | Mod: CPTII,S$GLB,, | Performed by: ORTHOPAEDIC SURGERY

## 2022-10-26 PROCEDURE — 73030 X-RAY EXAM OF SHOULDER: CPT | Mod: 26,RT,, | Performed by: RADIOLOGY

## 2022-10-26 PROCEDURE — 1160F PR REVIEW ALL MEDS BY PRESCRIBER/CLIN PHARMACIST DOCUMENTED: ICD-10-PCS | Mod: CPTII,S$GLB,, | Performed by: ORTHOPAEDIC SURGERY

## 2022-10-26 RX ORDER — TRIAMCINOLONE ACETONIDE 40 MG/ML
80 INJECTION, SUSPENSION INTRA-ARTICULAR; INTRAMUSCULAR
Status: SHIPPED | OUTPATIENT
Start: 2022-10-26

## 2022-10-26 RX ADMIN — TRIAMCINOLONE ACETONIDE 80 MG: 40 INJECTION, SUSPENSION INTRA-ARTICULAR; INTRAMUSCULAR at 01:10

## 2022-10-26 NOTE — PROCEDURES
Large Joint Aspiration/Injection: R subacromial bursa    Date/Time: 10/26/2022 1:15 PM  Performed by: Deb Soliman MD  Authorized by: Deb Soliman MD     Consent Done?:  Yes (Verbal)  Indications:  Pain  Timeout: prior to procedure the correct patient, procedure, and site was verified    Prep: patient was prepped and draped in usual sterile fashion      Local anesthesia used?: Yes    Local anesthetic:  Topical anesthetic    Details:  Needle Size:  22 G  Approach:  Posterior  Location:  Shoulder  Site:  R subacromial bursa  Medications:  80 mg triamcinolone acetonide 40 mg/mL  Patient tolerance:  Patient tolerated the procedure well with no immediate complications

## 2022-11-03 ENCOUNTER — TELEPHONE (OUTPATIENT)
Dept: FAMILY MEDICINE | Facility: CLINIC | Age: 57
End: 2022-11-03
Payer: COMMERCIAL

## 2022-11-03 DIAGNOSIS — K50.10 CROHN'S DISEASE OF COLON WITHOUT COMPLICATION: ICD-10-CM

## 2022-11-03 DIAGNOSIS — Z12.11 ENCOUNTER FOR FIT (FECAL IMMUNOCHEMICAL TEST) SCREENING: Primary | ICD-10-CM

## 2022-11-03 NOTE — TELEPHONE ENCOUNTER
Can we call him and let him know his blood count has dropped three points from his last lab check. I think we need to get a colonoscopy to make sure he is not having blood in his stool.    If he is ok with this I can order it for him    Betzaida Meyer MD

## 2022-11-03 NOTE — TELEPHONE ENCOUNTER
He has Crohn's disease and this has not been followed it is likely to be the cause of his bleeding and a fobt is not sufficient to assess this.    I am placing a referral to a gastro doctor so he can get his Crohn's under better control.    Betzaida Meyer MD

## 2022-11-03 NOTE — TELEPHONE ENCOUNTER
Notified patient of Dr. Meyer's message and that a gastro referral was placed. Patient verbalized understanding.

## 2022-11-03 NOTE — TELEPHONE ENCOUNTER
Patient states that he is still dealing with shoulder pain at the moment and would like to know if he can just get a FOBT card instead of the colonoscopy.    Please advise.

## 2022-11-11 ENCOUNTER — TELEPHONE (OUTPATIENT)
Dept: FAMILY MEDICINE | Facility: CLINIC | Age: 57
End: 2022-11-11
Payer: COMMERCIAL

## 2022-11-11 ENCOUNTER — HOSPITAL ENCOUNTER (OUTPATIENT)
Facility: HOSPITAL | Age: 57
Discharge: HOME | End: 2022-11-11
Attending: STUDENT IN AN ORGANIZED HEALTH CARE EDUCATION/TRAINING PROGRAM | Admitting: HOSPITALIST
Payer: COMMERCIAL

## 2022-11-11 VITALS
OXYGEN SATURATION: 99 % | DIASTOLIC BLOOD PRESSURE: 89 MMHG | TEMPERATURE: 99 F | BODY MASS INDEX: 20.72 KG/M2 | RESPIRATION RATE: 19 BRPM | HEART RATE: 62 BPM | SYSTOLIC BLOOD PRESSURE: 138 MMHG | WEIGHT: 136.69 LBS | HEIGHT: 68 IN

## 2022-11-11 DIAGNOSIS — K85.90 ACUTE PANCREATITIS: ICD-10-CM

## 2022-11-11 DIAGNOSIS — K50.10 CROHN'S DISEASE OF COLON WITHOUT COMPLICATION: ICD-10-CM

## 2022-11-11 DIAGNOSIS — R07.9 CHEST PAIN: ICD-10-CM

## 2022-11-11 DIAGNOSIS — R11.14 BILIOUS VOMITING WITH NAUSEA: Primary | ICD-10-CM

## 2022-11-11 PROBLEM — R63.4 UNINTENTIONAL WEIGHT LOSS: Status: ACTIVE | Noted: 2022-11-11

## 2022-11-11 PROBLEM — R10.9 ABDOMINAL PAIN: Status: ACTIVE | Noted: 2022-11-11

## 2022-11-11 PROBLEM — R11.2 NAUSEA AND VOMITING: Status: ACTIVE | Noted: 2022-11-11

## 2022-11-11 PROBLEM — Z72.0 TOBACCO ABUSE: Status: ACTIVE | Noted: 2022-11-11

## 2022-11-11 LAB
ALBUMIN SERPL BCP-MCNC: 3.8 G/DL (ref 3.5–5.2)
ALP SERPL-CCNC: 62 U/L (ref 55–135)
ALT SERPL W/O P-5'-P-CCNC: 11 U/L (ref 10–44)
AMPHET+METHAMPHET UR QL: NEGATIVE
ANION GAP SERPL CALC-SCNC: 9 MMOL/L (ref 8–16)
AST SERPL-CCNC: 16 U/L (ref 10–40)
BACTERIA #/AREA URNS HPF: ABNORMAL /HPF
BARBITURATES UR QL SCN>200 NG/ML: NEGATIVE
BASOPHILS # BLD AUTO: 0.07 K/UL (ref 0–0.2)
BASOPHILS NFR BLD: 1.1 % (ref 0–1.9)
BENZODIAZ UR QL SCN>200 NG/ML: NEGATIVE
BILIRUB SERPL-MCNC: 0.6 MG/DL (ref 0.1–1)
BILIRUB UR QL STRIP: NEGATIVE
BUN SERPL-MCNC: 13 MG/DL (ref 6–20)
BZE UR QL SCN: NEGATIVE
CALCIUM SERPL-MCNC: 9.1 MG/DL (ref 8.7–10.5)
CANNABINOIDS UR QL SCN: ABNORMAL
CHLORIDE SERPL-SCNC: 102 MMOL/L (ref 95–110)
CHOLEST SERPL-MCNC: 184 MG/DL (ref 120–199)
CHOLEST/HDLC SERPL: 3.5 {RATIO} (ref 2–5)
CLARITY UR: CLEAR
CO2 SERPL-SCNC: 26 MMOL/L (ref 23–29)
COLOR UR: YELLOW
CREAT SERPL-MCNC: 0.9 MG/DL (ref 0.5–1.4)
CREAT UR-MCNC: 124 MG/DL (ref 23–375)
CRP SERPL-MCNC: 0.7 MG/L (ref 0–8.2)
DIFFERENTIAL METHOD: ABNORMAL
EOSINOPHIL # BLD AUTO: 0.1 K/UL (ref 0–0.5)
EOSINOPHIL NFR BLD: 0.9 % (ref 0–8)
ERYTHROCYTE [DISTWIDTH] IN BLOOD BY AUTOMATED COUNT: 12.7 % (ref 11.5–14.5)
ERYTHROCYTE [SEDIMENTATION RATE] IN BLOOD BY WESTERGREN METHOD: 2 MM/HR (ref 0–10)
EST. GFR  (NO RACE VARIABLE): >60 ML/MIN/1.73 M^2
ESTIMATED AVG GLUCOSE: 111 MG/DL (ref 68–131)
ETHANOL SERPL-MCNC: <10 MG/DL
GLUCOSE SERPL-MCNC: 127 MG/DL (ref 70–110)
GLUCOSE UR QL STRIP: NEGATIVE
HBA1C MFR BLD: 5.5 % (ref 4–5.6)
HCT VFR BLD AUTO: 44.7 % (ref 40–54)
HDLC SERPL-MCNC: 53 MG/DL (ref 40–75)
HDLC SERPL: 28.8 % (ref 20–50)
HGB BLD-MCNC: 15.6 G/DL (ref 14–18)
HGB UR QL STRIP: NEGATIVE
HIV1+2 IGG SERPL QL IA.RAPID: NORMAL
HYALINE CASTS #/AREA URNS LPF: 0 /LPF
IMM GRANULOCYTES # BLD AUTO: 0.01 K/UL (ref 0–0.04)
IMM GRANULOCYTES NFR BLD AUTO: 0.2 % (ref 0–0.5)
KETONES UR QL STRIP: ABNORMAL
LDLC SERPL CALC-MCNC: 120.8 MG/DL (ref 63–159)
LEUKOCYTE ESTERASE UR QL STRIP: NEGATIVE
LIPASE SERPL-CCNC: 464 U/L (ref 4–60)
LYMPHOCYTES # BLD AUTO: 1.2 K/UL (ref 1–4.8)
LYMPHOCYTES NFR BLD: 18.7 % (ref 18–48)
MAGNESIUM SERPL-MCNC: 2 MG/DL (ref 1.6–2.6)
MCH RBC QN AUTO: 31 PG (ref 27–31)
MCHC RBC AUTO-ENTMCNC: 34.9 G/DL (ref 32–36)
MCV RBC AUTO: 89 FL (ref 82–98)
METHADONE UR QL SCN>300 NG/ML: NEGATIVE
MICROSCOPIC COMMENT: ABNORMAL
MONOCYTES # BLD AUTO: 0.8 K/UL (ref 0.3–1)
MONOCYTES NFR BLD: 11.8 % (ref 4–15)
NEUTROPHILS # BLD AUTO: 4.3 K/UL (ref 1.8–7.7)
NEUTROPHILS NFR BLD: 67.3 % (ref 38–73)
NITRITE UR QL STRIP: NEGATIVE
NONHDLC SERPL-MCNC: 131 MG/DL
NRBC BLD-RTO: 0 /100 WBC
OPIATES UR QL SCN: NEGATIVE
PCP UR QL SCN>25 NG/ML: NEGATIVE
PH UR STRIP: 8 [PH] (ref 5–8)
PLATELET # BLD AUTO: 349 K/UL (ref 150–450)
PMV BLD AUTO: 8.5 FL (ref 9.2–12.9)
POTASSIUM SERPL-SCNC: 3.9 MMOL/L (ref 3.5–5.1)
PROCALCITONIN SERPL IA-MCNC: 0.02 NG/ML
PROT SERPL-MCNC: 7.1 G/DL (ref 6–8.4)
PROT UR QL STRIP: ABNORMAL
RBC # BLD AUTO: 5.04 M/UL (ref 4.6–6.2)
RBC #/AREA URNS HPF: 9 /HPF (ref 0–4)
SODIUM SERPL-SCNC: 137 MMOL/L (ref 136–145)
SP GR UR STRIP: >1.03 (ref 1–1.03)
TOXICOLOGY INFORMATION: ABNORMAL
TRIGL SERPL-MCNC: 51 MG/DL (ref 30–150)
URN SPEC COLLECT METH UR: ABNORMAL
UROBILINOGEN UR STRIP-ACNC: ABNORMAL EU/DL
WBC # BLD AUTO: 6.38 K/UL (ref 3.9–12.7)
WBC #/AREA URNS HPF: 0 /HPF (ref 0–5)

## 2022-11-11 PROCEDURE — 83735 ASSAY OF MAGNESIUM: CPT | Performed by: PHYSICIAN ASSISTANT

## 2022-11-11 PROCEDURE — 82077 ASSAY SPEC XCP UR&BREATH IA: CPT | Performed by: HOSPITALIST

## 2022-11-11 PROCEDURE — 85025 COMPLETE CBC W/AUTO DIFF WBC: CPT | Performed by: PHYSICIAN ASSISTANT

## 2022-11-11 PROCEDURE — 86140 C-REACTIVE PROTEIN: CPT | Performed by: HOSPITALIST

## 2022-11-11 PROCEDURE — 99285 EMERGENCY DEPT VISIT HI MDM: CPT | Mod: 25

## 2022-11-11 PROCEDURE — 99204 PR OFFICE/OUTPT VISIT, NEW, LEVL IV, 45-59 MIN: ICD-10-PCS | Mod: ,,, | Performed by: NURSE PRACTITIONER

## 2022-11-11 PROCEDURE — 84145 PROCALCITONIN (PCT): CPT | Performed by: HOSPITALIST

## 2022-11-11 PROCEDURE — 85652 RBC SED RATE AUTOMATED: CPT | Performed by: HOSPITALIST

## 2022-11-11 PROCEDURE — 96374 THER/PROPH/DIAG INJ IV PUSH: CPT

## 2022-11-11 PROCEDURE — G0378 HOSPITAL OBSERVATION PER HR: HCPCS

## 2022-11-11 PROCEDURE — 96361 HYDRATE IV INFUSION ADD-ON: CPT

## 2022-11-11 PROCEDURE — 99204 OFFICE O/P NEW MOD 45 MIN: CPT | Mod: ,,, | Performed by: NURSE PRACTITIONER

## 2022-11-11 PROCEDURE — 80061 LIPID PANEL: CPT | Performed by: STUDENT IN AN ORGANIZED HEALTH CARE EDUCATION/TRAINING PROGRAM

## 2022-11-11 PROCEDURE — 83036 HEMOGLOBIN GLYCOSYLATED A1C: CPT | Performed by: HOSPITALIST

## 2022-11-11 PROCEDURE — 80307 DRUG TEST PRSMV CHEM ANLYZR: CPT | Performed by: HOSPITALIST

## 2022-11-11 PROCEDURE — 63600175 PHARM REV CODE 636 W HCPCS: Performed by: PHYSICIAN ASSISTANT

## 2022-11-11 PROCEDURE — 96375 TX/PRO/DX INJ NEW DRUG ADDON: CPT

## 2022-11-11 PROCEDURE — 25500020 PHARM REV CODE 255: Performed by: HOSPITALIST

## 2022-11-11 PROCEDURE — 25000003 PHARM REV CODE 250: Performed by: PHYSICIAN ASSISTANT

## 2022-11-11 PROCEDURE — 86703 HIV-1/HIV-2 1 RESULT ANTBDY: CPT | Performed by: HOSPITALIST

## 2022-11-11 PROCEDURE — 80053 COMPREHEN METABOLIC PANEL: CPT | Performed by: PHYSICIAN ASSISTANT

## 2022-11-11 PROCEDURE — 81000 URINALYSIS NONAUTO W/SCOPE: CPT | Mod: 59 | Performed by: PHYSICIAN ASSISTANT

## 2022-11-11 PROCEDURE — 83690 ASSAY OF LIPASE: CPT | Performed by: PHYSICIAN ASSISTANT

## 2022-11-11 PROCEDURE — 25000003 PHARM REV CODE 250: Performed by: HOSPITALIST

## 2022-11-11 PROCEDURE — 63600175 PHARM REV CODE 636 W HCPCS: Performed by: HOSPITALIST

## 2022-11-11 RX ORDER — LOPERAMIDE HYDROCHLORIDE 2 MG/1
2 CAPSULE ORAL 4 TIMES DAILY PRN
Qty: 30 CAPSULE | Refills: 1 | Status: SHIPPED | OUTPATIENT
Start: 2022-11-11 | End: 2022-11-21

## 2022-11-11 RX ORDER — ONDANSETRON 4 MG/1
4 TABLET, FILM COATED ORAL EVERY 6 HOURS PRN
Qty: 30 TABLET | Refills: 1 | Status: SHIPPED | OUTPATIENT
Start: 2022-11-11

## 2022-11-11 RX ORDER — PROCHLORPERAZINE EDISYLATE 5 MG/ML
5 INJECTION INTRAMUSCULAR; INTRAVENOUS EVERY 6 HOURS PRN
Status: DISCONTINUED | OUTPATIENT
Start: 2022-11-11 | End: 2022-11-11 | Stop reason: HOSPADM

## 2022-11-11 RX ORDER — FAMOTIDINE 10 MG/ML
20 INJECTION INTRAVENOUS
Status: COMPLETED | OUTPATIENT
Start: 2022-11-11 | End: 2022-11-11

## 2022-11-11 RX ORDER — DICYCLOMINE HYDROCHLORIDE 10 MG/1
10 CAPSULE ORAL 4 TIMES DAILY PRN
Qty: 30 CAPSULE | Refills: 1 | Status: SHIPPED | OUTPATIENT
Start: 2022-11-11 | End: 2022-12-11

## 2022-11-11 RX ORDER — MORPHINE SULFATE 4 MG/ML
4 INJECTION, SOLUTION INTRAMUSCULAR; INTRAVENOUS
Status: DISCONTINUED | OUTPATIENT
Start: 2022-11-11 | End: 2022-11-11 | Stop reason: HOSPADM

## 2022-11-11 RX ORDER — SODIUM CHLORIDE 9 MG/ML
INJECTION, SOLUTION INTRAVENOUS CONTINUOUS
Status: DISCONTINUED | OUTPATIENT
Start: 2022-11-11 | End: 2022-11-11 | Stop reason: HOSPADM

## 2022-11-11 RX ORDER — MORPHINE SULFATE 4 MG/ML
2 INJECTION, SOLUTION INTRAMUSCULAR; INTRAVENOUS EVERY 4 HOURS PRN
Status: DISCONTINUED | OUTPATIENT
Start: 2022-11-11 | End: 2022-11-11 | Stop reason: HOSPADM

## 2022-11-11 RX ORDER — SODIUM CHLORIDE 0.9 % (FLUSH) 0.9 %
10 SYRINGE (ML) INJECTION
Status: DISCONTINUED | OUTPATIENT
Start: 2022-11-11 | End: 2022-11-11 | Stop reason: HOSPADM

## 2022-11-11 RX ORDER — ONDANSETRON 2 MG/ML
4 INJECTION INTRAMUSCULAR; INTRAVENOUS
Status: COMPLETED | OUTPATIENT
Start: 2022-11-11 | End: 2022-11-11

## 2022-11-11 RX ORDER — TALC
6 POWDER (GRAM) TOPICAL NIGHTLY PRN
Status: DISCONTINUED | OUTPATIENT
Start: 2022-11-11 | End: 2022-11-11 | Stop reason: HOSPADM

## 2022-11-11 RX ORDER — NICOTINE 7MG/24HR
1 PATCH, TRANSDERMAL 24 HOURS TRANSDERMAL DAILY
Status: DISCONTINUED | OUTPATIENT
Start: 2022-11-11 | End: 2022-11-11 | Stop reason: HOSPADM

## 2022-11-11 RX ORDER — GLUCAGON 1 MG
1 KIT INJECTION
Status: DISCONTINUED | OUTPATIENT
Start: 2022-11-11 | End: 2022-11-11 | Stop reason: HOSPADM

## 2022-11-11 RX ORDER — ONDANSETRON 2 MG/ML
4 INJECTION INTRAMUSCULAR; INTRAVENOUS EVERY 4 HOURS PRN
Status: DISCONTINUED | OUTPATIENT
Start: 2022-11-11 | End: 2022-11-11 | Stop reason: HOSPADM

## 2022-11-11 RX ORDER — OXYCODONE HYDROCHLORIDE 5 MG/1
5 TABLET ORAL EVERY 4 HOURS PRN
Status: DISCONTINUED | OUTPATIENT
Start: 2022-11-11 | End: 2022-11-11 | Stop reason: HOSPADM

## 2022-11-11 RX ORDER — IBUPROFEN 200 MG
24 TABLET ORAL
Status: DISCONTINUED | OUTPATIENT
Start: 2022-11-11 | End: 2022-11-11 | Stop reason: HOSPADM

## 2022-11-11 RX ORDER — DEXAMETHASONE SODIUM PHOSPHATE 4 MG/ML
12 INJECTION, SOLUTION INTRA-ARTICULAR; INTRALESIONAL; INTRAMUSCULAR; INTRAVENOUS; SOFT TISSUE
Status: DISCONTINUED | OUTPATIENT
Start: 2022-11-11 | End: 2022-11-11

## 2022-11-11 RX ORDER — DICYCLOMINE HYDROCHLORIDE 10 MG/1
10 CAPSULE ORAL 4 TIMES DAILY PRN
Status: DISCONTINUED | OUTPATIENT
Start: 2022-11-11 | End: 2022-11-11 | Stop reason: HOSPADM

## 2022-11-11 RX ORDER — IBUPROFEN 200 MG
1 TABLET ORAL DAILY
Qty: 30 PATCH | Refills: 1 | Status: SHIPPED | OUTPATIENT
Start: 2022-11-11

## 2022-11-11 RX ORDER — IBUPROFEN 200 MG
16 TABLET ORAL
Status: DISCONTINUED | OUTPATIENT
Start: 2022-11-11 | End: 2022-11-11 | Stop reason: HOSPADM

## 2022-11-11 RX ORDER — NALOXONE HCL 0.4 MG/ML
0.02 VIAL (ML) INJECTION
Status: DISCONTINUED | OUTPATIENT
Start: 2022-11-11 | End: 2022-11-11 | Stop reason: HOSPADM

## 2022-11-11 RX ORDER — ACETAMINOPHEN 325 MG/1
650 TABLET ORAL EVERY 6 HOURS PRN
Status: DISCONTINUED | OUTPATIENT
Start: 2022-11-11 | End: 2022-11-11 | Stop reason: HOSPADM

## 2022-11-11 RX ORDER — LOPERAMIDE HYDROCHLORIDE 2 MG/1
2 CAPSULE ORAL 4 TIMES DAILY PRN
Status: DISCONTINUED | OUTPATIENT
Start: 2022-11-11 | End: 2022-11-11 | Stop reason: HOSPADM

## 2022-11-11 RX ADMIN — OXYCODONE 5 MG: 5 TABLET ORAL at 03:11

## 2022-11-11 RX ADMIN — SODIUM CHLORIDE, SODIUM LACTATE, POTASSIUM CHLORIDE, AND CALCIUM CHLORIDE 1000 ML: .6; .31; .03; .02 INJECTION, SOLUTION INTRAVENOUS at 05:11

## 2022-11-11 RX ADMIN — ONDANSETRON 4 MG: 2 INJECTION INTRAMUSCULAR; INTRAVENOUS at 05:11

## 2022-11-11 RX ADMIN — DICYCLOMINE HYDROCHLORIDE 10 MG: 10 CAPSULE ORAL at 02:11

## 2022-11-11 RX ADMIN — IOHEXOL 75 ML: 350 INJECTION, SOLUTION INTRAVENOUS at 07:11

## 2022-11-11 RX ADMIN — OXYCODONE 5 MG: 5 TABLET ORAL at 07:11

## 2022-11-11 RX ADMIN — FAMOTIDINE 20 MG: 10 INJECTION INTRAVENOUS at 05:11

## 2022-11-11 RX ADMIN — MORPHINE SULFATE 2 MG: 4 INJECTION INTRAVENOUS at 12:11

## 2022-11-11 NOTE — Clinical Note
Diagnosis: Acute pancreatitis [577.0.ICD-9-CM]   Future Attending Provider: TAMIE LINDSEY [[id number]]   Admitting Provider:: TAMIE LINDSEY [[id number]]

## 2022-11-11 NOTE — PLAN OF CARE
Nutrition Plan of Care:    Recommendations     Recommendation/Intervention:   1. Encourage po intake as tolerated    2. Recommend oral supplements   3. Daily weights   4. Collaboration with medical providers     Goals: Patient to consume 50% of EEN prior to RD follow up.     Nutrition Goal Status: new     Communication of RD Recs: other (comment) (Consults, Poc)     Assessment and Plan  Nutrition Problem  Altered GI Function     Related to (etiology):   Condition associated with diagnoses: abdominal pain, possible crohns disease     Signs and Symptoms (as evidenced by):   Decreased po intake  Intolerance: nausea, vomiting, and abdominal pain      Interventions/Recommendations (treatment strategy):  1. Encourage po intake as tolerated    2. Recommend oral supplements   3. Daily weights   4. Collaboration with medical providers     Nutrition Diagnosis Status:   New    Lupe Morris MS, RDN, LDN

## 2022-11-11 NOTE — NURSING
Pt awake and alert during discharge instructions. Pt aware to  new RX from preferred pharmacy this evening. Pt aware of follow up appointments and the importance of keeping them. No distress noted, pt denies any pain at this time requiring pain medication. PIV removed from RFA, site without pain, redness, drainage or swelling. All questions and concerns addressed, verbalizes his extreme satisfaction with our service. Pt ambulated off unit, to personal vehicle.

## 2022-11-11 NOTE — HPI
"Mr. Singleton is a 57 year old man with Crohn's disease and tobacco abuse who presented for evaluation of nausea and vomiting.  He states over the last 1.5 days he has had severe nausea and about 6 episodes of bilious non-bloody vomiting.  Concurrent with this has been some left lower quadrant abdominal discomfort he describes as a "little cramp".  He notes he has had two episodes of diarrhea this week, which are pretty common for him.  He denies fevers but has had some chills.  He notes a gradual decline in his appetite in general over the last several months - but denies any difficulty eating before this acute episode.  He denies any new medications or foods out of his ordinary routine.  He notes an unintentional weight loss of 12lbs in the last 45 days.  He has no right upper quadrant abdominal or back pain.  He denies chest pain, shortness of breath, palpitations and sick contacts.  He states he is hungry and wishes to eat now.  "

## 2022-11-11 NOTE — PLAN OF CARE
[facility]  Discharge Assessment    Spoke with patient at bedside. Patient independent from home alone, no current dme needs. Awaiting lunch to determine if diet can be tolerated. Hospital follow up appointment with pcp scheduled and listed on avs. [place] to continue to follow for dc needs.     Primary Care Provider: Jose Montanez MD     Discharge Assessment (most recent)       BRIEF DISCHARGE ASSESSMENT - 11/11/22 5136          Discharge Planning    Assessment Type Discharge Planning Assessment     Resource/Environmental Concerns none     Support Systems Family members     Equipment Currently Used at Home none     Current Living Arrangements home/apartment/condo     Patient/Family Anticipates Transition to home     Patient/Family Anticipated Services at Transition none     DME Needed Upon Discharge  none     Discharge Plan A Home     Discharge Plan B Home

## 2022-11-11 NOTE — ED TRIAGE NOTES
Jamin RODRIGUEZ Mani Melton. Is a 57 y.o male to ED c/o n/v, abdominal pain x2 days.  Hx of Crohn's disease.  6-7 episodes of vomiting reported today. Denies any diarrhea or other complaint. No meds pta.

## 2022-11-11 NOTE — DISCHARGE INSTRUCTIONS
Take all medications as prescribed.  Do not smoke marijuana - this can actually make you have uncontrolled vomiting.  Eat a regular diet  Follow up with your physicians as scheduled - pcp within 1 week and gastroenterologist within 1-2 weeks  Thank you for trusting [facility] and Dr. Pacheco with your care.  We are honored that you entrusted us with your healthcare needs. Your satisfaction is very important to us and we hope you have been very pleased with your experience at [facility]. After your discharge you may receive a survey asking you to rate your hospital experience. We encourage you to take the time to complete the survey as your feedback allows us to identify areas for improvement as well as recognize our staff.   We hope that you have received the very best care possible during your hospitalization at [facility], as your satisfaction is our top priority.

## 2022-11-11 NOTE — TELEPHONE ENCOUNTER
Notified patient that he already has a gastroenterologist referral was already placed on 11/3/22 - provided referrals team number

## 2022-11-11 NOTE — ASSESSMENT & PLAN NOTE
-Placed in observation  -Noted general decline in appetite over last month or so with unintentional weight loss and now acute nausea and vomiting x 1.5 days associated with mild LLQ cramping.  -Afebrile with normal WBC, ESR and CRP  -Lipase slightly elevated at 464.  Triglycerides 51.  EtOH <10.  HIV negative.  UA without infection.  UDS positive for THC  -CT Abdomen/Pelvis without any gallstones, pancreatic inflammation, biliary dilation or intestinal inflammation.  -Differential includes pancreatitis, Crohn's flare, gastroenteritis, gastritis and THC associated hyperemisis  -Consulted GI and input appreciated.  No convincing evidence of pancreatitis or Crohn's flare.  They will follow up in clinic  -Patient tolerating solid diet with some mild cramps.  Will trial bentyl and give gentle IV fluids.  -Hopeful to be able to discharge home today.

## 2022-11-11 NOTE — ED PROVIDER NOTES
Encounter Date: 11/11/2022    SCRIBE #1 NOTE: I, German Estes, am scribing for, and in the presence of,  Adriel Cowan MD. I have scribed the following portions of the note - Other sections scribed: HPI, ROS, PE.     History     Chief Complaint   Patient presents with    Vomiting     Pt reports nausea & vomiting x 2 days; pt has hx of Crohn's disease but has not had flair up in a couple years; no meds taken for symptoms; denies pain     Jamin Singleton Jr. Is a 57 y.o male with a PMHx of Crohn's disease, and DJD, that comes to the ED complaining of vomiting beginning 2 days ago. Patient reports complaints of vomiting for two days, endorsing associated nausea, frontal headache, diarrhea, and lower abdominal pain. Patient further report he recently had an MRI of his shoulder and blood work done, and was told he needed a colonoscopy. No medications taken PTA. No alleviating or exacerbating factors noted. Denies dysuria, hematuria, blood in stool, CP, SOB, fever, chills, or other associated symptoms. No known allergies.     The history is provided by the patient. No  was used.   Review of patient's allergies indicates:  No Known Allergies  Past Medical History:   Diagnosis Date    Crohn's disease     pt reported    Degenerative disk disease     Disc disease, degenerative, thoracic or thoracolumbar     DJD (degenerative joint disease)     Tobacco abuse      Past Surgical History:   Procedure Laterality Date    KNEE SURGERY  2003     Family History   Problem Relation Age of Onset    No Known Problems Mother     No Known Problems Father      Social History     Tobacco Use    Smoking status: Every Day     Packs/day: 1.00     Types: Cigarettes    Smokeless tobacco: Never   Substance Use Topics    Alcohol use: Yes     Comment: occasionally    Drug use: No     Review of Systems   Constitutional:  Negative for chills and fever.   HENT:  Negative for facial swelling and sore throat.    Eyes:   Negative for visual disturbance.   Respiratory:  Negative for cough and shortness of breath.    Cardiovascular:  Negative for chest pain and palpitations.   Gastrointestinal:  Positive for abdominal pain (lower), diarrhea, nausea and vomiting. Negative for blood in stool.   Genitourinary:  Negative for dysuria and hematuria.   Musculoskeletal:  Negative for back pain.   Skin:  Negative for rash.   Neurological:  Positive for headaches (frontal). Negative for weakness.   Hematological:  Does not bruise/bleed easily.   Psychiatric/Behavioral: Negative.       Physical Exam     Initial Vitals [11/11/22 0431]   BP Pulse Resp Temp SpO2   (!) 149/91 70 19 98.6 °F (37 °C) 100 %      MAP       --         Physical Exam    Nursing note and vitals reviewed.  Constitutional: He appears well-developed and well-nourished. He is not diaphoretic. No distress.   HENT:   Head: Normocephalic and atraumatic.   Right Ear: External ear normal.   Left Ear: External ear normal.   Nose: Nose normal.   Eyes: Conjunctivae are normal. No scleral icterus.   Neck: Neck supple. No tracheal deviation present.   Cardiovascular:  Normal rate, regular rhythm and normal heart sounds.     Exam reveals no gallop and no friction rub.       No murmur heard.  Pulses:       Radial pulses are 2+ on the right side and 2+ on the left side.        Dorsalis pedis pulses are 2+ on the right side and 2+ on the left side.   Pulmonary/Chest: Breath sounds normal. No respiratory distress.   Abdominal: Abdomen is soft. Bowel sounds are normal. There is abdominal tenderness in the suprapubic area and left lower quadrant. There is no rebound and no guarding.   Musculoskeletal:      Cervical back: Neck supple.     Neurological: He is alert and oriented to person, place, and time. GCS score is 15. GCS eye subscore is 4. GCS verbal subscore is 5. GCS motor subscore is 6.   Skin: Skin is warm and dry.   Psychiatric: He has a normal mood and affect. Thought content normal.        ED Course   Procedures  Labs Reviewed   CBC W/ AUTO DIFFERENTIAL - Abnormal; Notable for the following components:       Result Value    MPV 8.5 (*)     All other components within normal limits   COMPREHENSIVE METABOLIC PANEL - Abnormal; Notable for the following components:    Glucose 127 (*)     All other components within normal limits   LIPASE - Abnormal; Notable for the following components:    Lipase 464 (*)     All other components within normal limits   MAGNESIUM          Imaging Results              CT Abdomen Pelvis With Contrast (Final result)  Result time 11/11/22 09:26:17      Final result by Vargas Singh MD (11/11/22 09:26:17)                   Impression:      1.  No acute abnormality in this patient with history of nausea and vomiting.    2.  A 0.9 cm hypodense lesion is seen at the left hepatic lobe, too small to characterize.  This likely corresponds to the small echogenic lesion seen in this area on the patient's prior ultrasound examination dated 04/01/2019.  Prior ultrasound suggested that this may reflect a small hemangioma.  MRI may be considered for further evaluation.    3.  Additional findings including mild aortoiliac atherosclerosis, what appear to be 2 small right renal arteries, prostate calcifications, and DJD with mild anterior wedging of the T12 vertebral body.      Electronically signed by: Vargas Singh MD  Date:    11/11/2022  Time:    09:26               Narrative:    EXAMINATION:  CT ABDOMEN PELVIS WITH CONTRAST    CLINICAL HISTORY:  Nausea/vomiting;    TECHNIQUE:  Low dose axial images, sagittal and coronal reformations were obtained from the lung bases to the pubic symphysis following the IV administration of 75 mL of Omnipaque 350 .    COMPARISON:  Ultrasound abdomen limited dated 04/01/2019    FINDINGS:  Abdomen:    - Lower thorax:Heart is not enlarged.  No pericardial effusion.    - Lung bases: Mild dependent atelectatic changes.  No infiltrates and no nodules.  No  pleural effusion.    - Liver: Liver is normal in size and appears somewhat heterogeneous.  A 0.9 cm hypodense lesion is seen at the left hepatic lobe (series 2, image 19), too small to characterize.  This likely corresponds to the small echogenic lesion seen in the left hepatic lobe on the patient's prior ultrasound examination.  Previous ultrasound suggested that this may reflect a small hemangioma.  Once again, MRI may be considered for further evaluation.  No other focal abnormality identified within the liver.  Portal vein is patent.    - Gallbladder: No calcified gallstones.    - Bile Ducts: No evidence of intra or extra hepatic biliary ductal dilation.  Common duct measures 0.7 cm (series 601, image 60), similar to prior ultrasound exam.    - Spleen: Negative.    - Kidneys: Kidneys are normal in size, position and contour.  Both kidneys concentrate contrast material.  No definite evidence of renal mass lesion or nephrolithiasis.  No hydronephrosis or hydroureter.  Bladder is within normal limits.    - Adrenals: Unremarkable.    - Pancreas: No mass or peripancreatic fat stranding.    - Retroperitoneum:  No significant adenopathy.    - Vascular: No abdominal aortic aneurysm.  Mild aortoiliac atherosclerosis.  There may be 2 small right renal arteries.    - Abdominal wall:  Unremarkable.    Pelvis:    No pelvic mass, adenopathy, or free fluid.  Prostate calcifications.    Bowel/Mesentery:    Stomach is within normal limits.  No evidence of bowel obstruction or inflammation.  Appendix is within normal limits.    Bones:  No acute osseous abnormality and no suspicious lytic or blastic lesion.  Age-appropriate degenerative changes.  Mild anterior wedging of the T12 vertebral body.                                       Medications   lactated ringers bolus 1,000 mL (0 mLs Intravenous Stopped 11/11/22 0604)   ondansetron injection 4 mg (4 mg Intravenous Given 11/11/22 0946)   famotidine (PF) injection 20 mg (20 mg  Intravenous Given 11/11/22 0508)   iohexoL (OMNIPAQUE 350) injection 75 mL (75 mLs Intravenous Given 11/11/22 0741)     Medical Decision Making:   History:   Old Medical Records: I decided to obtain old medical records.  Clinical Tests:   Lab Tests: Ordered and Reviewed        Scribe Attestation:   Scribe #1: I performed the above scribed service and the documentation accurately describes the services I performed. I attest to the accuracy of the note.      ED Course as of 11/14/22 2108 Fri Nov 11, 2022   0605 Patient presenting to the emergency department for evaluation of abdominal pain, nausea, vomiting.  6-7 episodes of vomiting reported today.  Denies fever, chills.  Vitals stable.  Lipase 464 indicative of pancreatitis.  Electrolytes within normal limits.  Doubt derangement.  Afebrile, no leukocytosis, doubt infectious etiology.  Patient will need further workup into etiology of pancreatitis.  He is a nondrinker.  I have ordered a lipid panel as well as a right upper quadrant ultrasound.  Plan to place in observation with Hospital Medicine. [CC]      ED Course User Index  [CC] Adriel Cowan MD                 Clinical Impression:   Final diagnoses:  [K85.90] Acute pancreatitis      ED Disposition Condition    Observation Stable        I, Adriel Cowan MD, personally performed the services described in this documentation. All medical record entries made by the scribe were at my direction and in my presence. I have reviewed the chart and agree that the record reflects my personal performance and is accurate and complete.          Adriel Cowan MD  11/14/22 2108

## 2022-11-11 NOTE — CONSULTS
[facility] [place] [facility]  Adult Nutrition  Consult Note    SUMMARY     Recommendations    Recommendation/Intervention:   1. Encourage po intake as tolerated    2. Recommend oral supplements   3. Daily weights   4. Collaboration with medical providers    Goals: Patient to consume 50% of EEN prior to RD follow up.    Nutrition Goal Status: new    Communication of RD Recs: other (comment) (Consults, Poc)    Assessment and Plan  Nutrition Problem  Altered GI Function    Related to (etiology):   Condition associated with diagnoses: abdominal pain, possible crohns disease    Signs and Symptoms (as evidenced by):   Decreased po intake  Intolerance: nausea, vomiting, and abdominal pain     Interventions/Recommendations (treatment strategy):  1. Encourage po intake as tolerated    2. Recommend oral supplements   3. Daily weights   4. Collaboration with medical providers    Nutrition Diagnosis Status:   New         Malnutrition Assessment         Micronutrient Evaluation: suspected deficiency   Energy Intake (Malnutrition): less than 75% for greater than or equal to 1 month                         Reason for Assessment    Reason For Assessment: consult, identified at risk by screening criteria, other (see comments) (Consulted for weight loss)    Diagnosis: gastrointestinal disease, other (see comments) (Crohns)  Patient Active Problem List   Diagnosis    Crohn's disease of colon without complication    Chronic bilateral low back pain without sciatica    Palpitations    Syncope and collapse    Chest pain, atypical    Abdominal pain    Tobacco abuse     Past Medical History:   Diagnosis Date    Crohn's disease     pt reported    Degenerative disk disease     Disc disease, degenerative, thoracic or thoracolumbar     DJD (degenerative joint disease)        Interdisciplinary Rounds: did not attend (RD remote)    General Information Comments:   11/11: Patient with a regular diet at this time.  Patient with abdominal pain, nausea and  "vomiting.  PO intake is and has been reduced.  Patient reported 12# weight loss within 46 days.  Patient chart shows weight of 62.6kg on 10/22.  Patient is positive for malnutrition criteria of decreased po intake.  On site RD to follow up with complete NFPE assessment.  Labs reviewed.  NKFA.  LBM:11/10.  (RD remote)    Nutrition Discharge Planning: Patient to continue with adequate oral intake prior and post discharge.    Nutrition Risk Screen    Nutrition Risk Screen: other (see comments) (nausea, vomiting, reported weight loss)    Nutrition/Diet History    Spiritual, Cultural Beliefs, Alevism Practices, Values that Affect Care: no  Food Allergies: NKFA  Factors Affecting Nutritional Intake: nausea/vomiting, decreased appetite, abdominal pain    Anthropometrics    Temp: 99 °F (37.2 °C)  Height Method: Stated  Height: 5' 8" (172.7 cm)  Height (inches): 68 in  Weight Method: Bed Scale  Weight: 62 kg (136 lb 11 oz)  Weight (lb): 136.69 lb  Ideal Body Weight (IBW), Male: 154 lb  % Ideal Body Weight, Male (lb): 88.76 %  BMI (Calculated): 20.8  BMI Grade: 18.5-24.9 - normal  Weight Loss: unintentional (reported 12lbs in 45 days, chart history differs)  Usual Body Weight (UBW), k.6 kg (10/2022)  % Usual Body Weight: 99.25  % Weight Change From Usual Weight: -0.96 %     Wt Readings from Last 5 Encounters:   22 62 kg (136 lb 11 oz)   10/15/22 62.6 kg (138 lb 0.1 oz)   22 63.5 kg (140 lb)   21 64 kg (141 lb)   21 63 kg (138 lb 14.2 oz)       Lab/Procedures/Meds    Pertinent Labs Reviewed: reviewed  Pertinent Medications Reviewed: reviewed  BMP  Lab Results   Component Value Date     2022    K 3.9 2022     2022    CO2 26 2022    BUN 13 2022    CREATININE 0.9 2022    CALCIUM 9.1 2022    ANIONGAP 9 2022    EGFRNORACEVR >60 2022     No results found for: LABA1C, HGBA1C  Lab Results   Component Value Date    ALBUMIN 3.8 2022 "     Scheduled Meds:   morphine  4 mg Intravenous ED 1 Time    nicotine  1 patch Transdermal Daily     Continuous Infusions:   sodium chloride 0.9% Stopped (11/11/22 0737)     PRN Meds:.acetaminophen, dextrose 10%, dextrose 10%, glucagon (human recombinant), glucose, glucose, influenza, loperamide, melatonin, morphine, naloxone, ondansetron, oxyCODONE, prochlorperazine, sodium chloride 0.9%    Physical Findings/Assessment         Estimated/Assessed Needs    Weight Used For Calorie Calculations: 62 kg (136 lb 11 oz)  Energy Calorie Requirements (kcal): 25-35kcals/kg (1550-2170kcals/day)  Energy Need Method: Kcal/kg  Protein Requirements: 0.8-1.2g/kg  Weight Used For Protein Calculations: 62 kg (136 lb 11 oz)     Estimated Fluid Requirement Method: RDA Method  RDA Method (mL): 25  CHO Requirement: 200      Nutrition Prescription Ordered    Current Diet Order: Regular    Evaluation of Received Nutrient/Fluid Intake    % Kcal Needs: <50  % Protein Needs: <50  Energy Calories Required: not meeting needs  Protein Required: not meeting needs  Fluid Required: not meeting needs  Tolerance: not tolerating  % Intake of Estimated Energy Needs: 0 - 25 %  % Meal Intake: 0 - 25 %  Intake/Output - Last 3 Shifts         11/09 0700  11/10 0659 11/10 0700  11/11 0659 11/11 0700  11/12 0659    IV Piggyback  1000     Total Intake(mL/kg)  1000 (16.3)     Net  +1000                    Nutrition Risk    Level of Risk/Frequency of Follow-up: moderate       Monitor and Evaluation    Food and Nutrient Intake: energy intake, food and beverage intake  Food and Nutrient Adminstration: diet order  Knowledge/Beliefs/Attitudes: food and nutrition knowledge/skill, beliefs and attitudes  Physical Activity and Function: nutrition-related ADLs and IADLs, factors affecting access to physical activity  Anthropometric Measurements: height/length, weight, weight change, body mass index  Biochemical Data, Medical Tests and Procedures: inflammatory profile,  glucose/endocrine profile, gastrointestinal profile, electrolyte and renal panel, lipid profile  Nutrition-Focused Physical Findings: other (specify) (decreased appetite/intake and reported weight loss)       Nutrition Follow-Up    RD Follow-up?: Yes  Lupe Morris MS, RDN, LDN

## 2022-11-11 NOTE — TELEPHONE ENCOUNTER
----- Message from Margy Bates sent at 11/11/2022  4:38 PM CST -----  Regarding: Referral Request  Type:  Patient Requesting Referral    Who Called:    Referral to What Specialty: GI     Reason for Referral: Nausea, vomiting, Chron's diease     Does the patient want the referral with a specific physician?: First Available     Is the specialist an [facility]?: [facility]     Would the patient rather a call back or a response via [facility]? Call     Best Call Back Number: [phone]      Additional Information:

## 2022-11-11 NOTE — PLAN OF CARE
Problem: Adult Inpatient Plan of Care  Goal: Plan of Care Review  11/11/2022 1731 by Kia Soto RN  Outcome: Adequate for Care Transition  11/11/2022 1731 by Kia Soto RN  Outcome: Ongoing, Progressing  Goal: Patient-Specific Goal (Individualized)  11/11/2022 1731 by Kia Soto RN  Outcome: Adequate for Care Transition  11/11/2022 1731 by Kia Soto RN  Outcome: Ongoing, Progressing  Goal: Absence of Hospital-Acquired Illness or Injury  11/11/2022 1731 by Kia Soto RN  Outcome: Adequate for Care Transition  11/11/2022 1731 by Kia Soto RN  Outcome: Ongoing, Progressing  Goal: Optimal Comfort and Wellbeing  11/11/2022 1731 by Kia Soto RN  Outcome: Adequate for Care Transition  11/11/2022 1731 by Kia Soto RN  Outcome: Ongoing, Progressing  Goal: Readiness for Transition of Care  11/11/2022 1731 by Kia Soto RN  Outcome: Adequate for Care Transition  11/11/2022 1731 by Kia Soto RN  Outcome: Ongoing, Progressing

## 2022-11-11 NOTE — CONSULTS
2021         RE: Julia Boudreaux  [address] [facility] [address]        Dear Colleague,    Thank you for referring your patient, Julia Boudreaux, to the [facility] RADIATION ONCOLOGY. Please see a copy of my visit note below.    [facility]  Radiation Oncology Follow-up Note  May 5, 2021    Julia Boudreaux   MRN: [id number]  : 1963     DISEASE TREATED:  Adenocarcinoma of the RUL    INTERVAL SINCE COMPLETION OF RADIATION THERAPY: 19 months; completed treatment on 10/04/2019     TYPE OF RADIATION THERAPY ADMINISTERED: 5000 cGy in 5 fractions, Rx to 86%IDL.        SUBJECTIVE: Ms. Boudreaux is a 58 year old woman, current smoker, with a history of early stage NSCLC of the RUL. She initially sought medical attention for evaluation of unexplained weight loss (30 Ibs over the past year) in May 2019. She was evaluated by CT chest abdomen pelvis, at [facility], [place], [place], on  which demonstrated an incidental 0.9 x 2.2 x 12 cm right upper lobe nodule; as well as mildly prominent right hilar lymph node measuring 0.9 x 1.1 cm. The patient was referred to Pulmonary Medicine at [facility] for further evaluation. She underwent a staging PET/CT on 2019 which redemonstrated a 2.3x 0.6 cm hypermetabolic pleural based right upper lobe nodule (SUV max 9.8) as well as additional non-FDG avid left apical nodule. There was no evidence of hypermetabolic mediastinal adenopathy or metastatic disease.      The patient then was seen by Dr. Johnston on 19. The recommendation was to proceed with surgical resection. She had a pulmonary function test which showed FEV1 90% (2.43L) and DLCO 82% (17.61 ml/min/mmHg). She then underwent a CT guided biopsy from the RUL lesion on 19. The pathology ([id number]) showed invasive adenocarcinoma, acinar predominant. The tumor cells were positive for TTF-1, but negative for Napsin A, cytokeratin CK-5/6, and p63,      [facility] Consultation Note    Patient Complaint: Nausea and Vomiting    PCP:   Betzaida Meyer       LOS: 0        Initial History of Present Illness (HPI):  This is a 57 y.o. male consulted to GI service for eval for Crohns flare vs pancreatitis. Patient complaint of acute onset of severe nausea and vomiting with associated symptoms of abdominal pain and diarrhea that began 2 days ago. Patient reports 4 episode of non bloody emesis, denies anything made it worse and better, reports attempting to take OTC medication without relief. Describes abdominal pain and tenderness as mild, crampy and located in the LLQ. Reports this feels like the beginning of a Crohns flare. Reports he has been having intermittent non bloody diarrhea over the last few days. Endorses smoking a pack a day. Denies NSAID use or heavy drinking. Reports a hx of Crohn's diagnosed in 1994 and in remission since 2004. Denies that he is followed by a GI MD. Reports last colonoscopy over 10 years ago with ulcerations throughout colon. Denies currently taking any Crohns medications.  CT unremarkable. Labs mostly normal, lipase elevated.  Today still repots very mild LLQ pain. N/v resolved. Requesting diet.      Review of Systems   Constitutional:  Positive for fatigue. Negative for activity change, chills, diaphoresis and fever.   HENT:  Negative for congestion, drooling, rhinorrhea, sore throat and trouble swallowing.    Eyes:  Negative for discharge.   Respiratory:  Negative for cough, shortness of breath and wheezing.    Cardiovascular:  Negative for chest pain, palpitations and leg swelling.   Gastrointestinal:  Positive for abdominal pain, diarrhea, nausea and vomiting. Negative for abdominal distention, anal bleeding, blood in stool, constipation and rectal pain.   Endocrine: Negative for cold intolerance and heat intolerance.   Genitourinary:  Negative for frequency, hematuria and urgency.   Musculoskeletal:  Negative for  arthralgias.   Skin:  Negative for color change, pallor and rash.   Neurological:  Negative for syncope, facial asymmetry, weakness and numbness.   Psychiatric/Behavioral:  Negative for agitation and confusion. The patient is not nervous/anxious.          Medical History:  has a past medical history of Crohn's disease, Degenerative disk disease, Disc disease, degenerative, thoracic or thoracolumbar, and DJD (degenerative joint disease).    Surgical History:  has a past surgical history that includes Knee surgery (2003).    Family History: Denies any pertinent family history.    Social History:  reports that he has been smoking cigarettes. He has been smoking an average of .5 packs per day. He has never used smokeless tobacco. He reports current alcohol use. He reports that he does not use drugs.    Review of patient's allergies indicates:  No Known Allergies    Current Facility-Administered Medications on File Prior to Encounter   Medication Dose Route Frequency Provider Last Rate Last Admin    triamcinolone acetonide injection 80 mg  80 mg Intra-articular  Deb Soliman MD   80 mg at 10/26/22 1315     Current Outpatient Medications on File Prior to Encounter   Medication Sig Dispense Refill    meloxicam (MOBIC) 15 MG tablet Take 1 tablet (15 mg total) by mouth once daily. 30 tablet 2    ondansetron (ZOFRAN-ODT) 8 MG TbDL DISSOLVE 1 TABLET(8 MG) ON THE TONGUE EVERY 8 HOURS AS NEEDED FOR NAUSEA (Patient not taking: Reported on 10/15/2022) 30 tablet 1        Objective Findings:    Vital Signs:  Temp:  [97 °F (36.1 °C)-99 °F (37.2 °C)]   Pulse:  [60-70]   Resp:  [14-20]   BP: (134-157)/(77-95)   SpO2:  [97 %-100 %]   Body mass index is 20.78 kg/m².      Physical Exam  Vitals and nursing note reviewed.   Constitutional:       Appearance: Normal appearance.   HENT:      Head: Normocephalic.      Nose: Nose normal.      Mouth/Throat:      Mouth: Mucous membranes are moist.   Eyes:      Pupils: Pupils are equal,  supporting the diagnosis. PD-L1 testing was highly positive (80%). The molecular genetic testing ([id number]) revealed no mutation in the BRAF, EGFR, ERBB2, KRAS, MET, NRAS, RET genes. Her procedure course was complicated by pneumothorax for which she was admitted from 8/12 through 8/15/19. She underwent placement of chest tube and discharged in a stable condition. The patient was reluctant to pursue surgery with her many co-morbidities and ambulation issues. She was thus treated with SBRT, as described above. We last saw the patient 4 months ago, when surveillance chest CT with without evidence of recurrent disease. She had complained of continuing night sweats at that visit.    Since we last saw the patient has continued to work with her PCP to further workup her constitutional symptoms. A CT-AP from 2/18/2021 revealed moderate colonic fecal retension but was without evidence of infection or neoplasia. 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 She is being managed with steroid injections. She presents for telephone follow-up today, having completed a surveillance PET/CT last week.    Today Ms. Boudreaux states that she is generally well. She continues to smoke daily and has no interest in quitting. She complains of ongoing right shoulder pain, but does not have any upper of mid back pain. She has gained some weight over the past few months, and denies fatigue, cough, SOB, headaches or hemoptysis.                                    OBJECTIVE: A/Ox3. NAD. Pleasant sounding on the phone      PET/CT: 4/29/2021                  IMPRESSION: In this patient with adenocarcinoma of the right upper  lobe status post radiation therapy:  1. Positive treatment response with significantly decreased FDG  activity of an irregular soft tissue lesion in the right lung apex,  there is increased surrounding fibroatelectatic  changes suspected  secondary to radiation treatment.  2. No findings suspicious for additional metastatic disease within the  chest, abdomen or pelvis.  2a. Unchanged 6 mm left upper lobe lung nodule stable from 2019.     3. Pelvic ascites.  4. Multinodular thyroid, similar to comparison examinations.  5. Inflammation of right axillary lymph nodes and shoulder musculature  secondary to recent Covid vaccination.       IMPRESSION: No clinical evidence for recurrent disease at this time. Her most recent scan demonstrates good metabolic response to treatment with evolving interval scarring consistent with radiation fibrosis. There is a right sided 3rd rib fracture, which is asymptomatic     RECOMMENDATIONS:  F/u in 3 months with repeat CT Chest, with NP.    Rakesh Poe MD-PhD PGY-5  Chief Radiation Oncology Resident, [facility]  [phone]    Telephone time: 11 minutes. 25 minutes spent on the date of the encounter doing chart review, review of test results, interpretation of tests, patient visit and documentation     I talked to the patient with the resident.  I agree with the resident's note and plan of care.      REYMUNDO Nj M.D.  Department of Radiation Oncology  [facility]    FOLLOW-UP VISIT    Patient Name: Julia Boudreaux      : 1963     Age: 58 year old        ______________________________________________________________________________     Chief Complaint   Patient presents with     Cancer     18 month fup for rad therapy to chest     LMP  (LMP Unknown)      Date Radiation Completed: 10/14/2019    Pain  Denies    Labs  Other Labs: No    Imaging  CT: PET 2021          Other Appointments:     MD Name:  Appointment Date:    MD Name: Appointment Date:   MD Name: Appointment Date:   Other Appointment Notes:     Residual Radiation side effect: none     Additional Instructions:     Nurse face-to-face time: Level 3:  10 min face to face  round, and reactive to light.   Cardiovascular:      Heart sounds: Normal heart sounds.   Pulmonary:      Effort: Pulmonary effort is normal.      Breath sounds: Normal breath sounds.   Abdominal:      General: Bowel sounds are normal. There is no distension.      Palpations: Abdomen is soft.      Tenderness: There is no abdominal tenderness.   Musculoskeletal:         General: Normal range of motion.      Cervical back: Normal range of motion.   Skin:     Capillary Refill: Capillary refill takes less than 2 seconds.   Neurological:      General: No focal deficit present.      Mental Status: He is alert and oriented to person, place, and time.   Psychiatric:         Mood and Affect: Mood normal.         Behavior: Behavior normal.         Thought Content: Thought content normal.         Judgment: Judgment normal.             Labs:  Lab Results   Component Value Date    WBC 6.38 11/11/2022    HGB 15.6 11/11/2022    HCT 44.7 11/11/2022     11/11/2022    CHOL 184 11/11/2022    TRIG 51 11/11/2022    HDL 53 11/11/2022    ALT 11 11/11/2022    AST 16 11/11/2022     11/11/2022    K 3.9 11/11/2022     11/11/2022    CREATININE 0.9 11/11/2022    BUN 13 11/11/2022    CO2 26 11/11/2022    PSA 0.68 02/04/2021             Imaging: CT abdomen pelvis-  Stomach is within normal limits.  No evidence of bowel obstruction or inflammation.  Appendix is within normal limits.   No acute abnormality.   A 0.9 cm hypodense lesion is seen at the left hepatic lobe, too small to characterize.  This likely corresponds to the small echogenic lesion seen in this area on the patient's prior ultrasound examination dated 04/01/2019.  Prior ultrasound suggested that this may reflect a small hemangioma.  MRI may be considered for further evaluation.    I have independently reviewed and interpreted the imaging above    Assessment:  Patient is a .57 y.o. y/o .male with  has a past medical history of Crohn's disease, Degenerative disk  disease, Disc disease, degenerative, thoracic or thoracolumbar, and DJD (degenerative joint disease). Consulted to the GI service for crohns flare vs pancreatitis.               Recommendations:  1. Abdominal pain. N/v. Elevated lipase. History of Crohns disease.     Abdominal pain, mild. N/v resolved. CT unremarkable, no inflammation seen. Received IV fluids. Not seemingly pancreatitis, if so, very mild case. Ok to resume diet.    Has had longstanding Crohns remission. CRP normal however endorses symptoms somatically. Plan to f/u in GI clinc outpatient. Office will contact patient with date and time.      Thank you so much for allowing us to participate in the care of Jamin YOUNG Mani Chacon . Please contact us if you have any additional questions.    Debra Lee NP  Gastroenterology  [facility]         time      Again, thank you for allowing me to participate in the care of your patient.        Sincerely,        Katey Nj MD

## 2022-11-11 NOTE — ASSESSMENT & PLAN NOTE
"-In "remission" for many years.  Notes has diarrhea regularly at home  -ESR and CRP are normal  -No evidence of bowel inflammation on CT abd/pelvis  -Follow up with GI in clinic.  "

## 2022-11-11 NOTE — ASSESSMENT & PLAN NOTE
-Body mass index is 20.78 kg/m².  -12lb weight loss in last 45 days per patient  -Possibly secondary to crohns and malabsorption  -Consult nutrition  -Add boost to all meals.

## 2022-11-11 NOTE — DISCHARGE SUMMARY
"[facility]  Discharge Summary      Patient Name: Jamin Singleton Jr.  MRN: [id number]  RANDY: [id number]  Patient Class: OP- Observation  Admission Date: 11/11/2022  Hospital Length of Stay: 0 days  Discharge Date and Time: No discharge date for patient encounter.  Attending Physician: Tamie Lindsey MD   Discharging Provider: Tamie Lindsey MD  Primary Care Provider: Betzaida Meyer MD    Primary Care Team: Networked reference to record PCT     HPI:   Mr. Singleton is a 57 year old man with Crohn's disease and tobacco abuse who presented for evaluation of nausea and vomiting.  He states over the last 1.5 days he has had severe nausea and about 6 episodes of bilious non-bloody vomiting.  Concurrent with this has been some left lower quadrant abdominal discomfort he describes as a "little cramp".  He notes he has had two episodes of diarrhea this week, which are pretty common for him.  He denies fevers but has had some chills.  He notes a gradual decline in his appetite in general over the last several months - but denies any difficulty eating before this acute episode.  He denies any new medications or foods out of his ordinary routine.  He notes an unintentional weight loss of 12lbs in the last 45 days.  He has no right upper quadrant abdominal or back pain.  He denies chest pain, shortness of breath, palpitations and sick contacts.  He states he is hungry and wishes to eat now.      * No surgery found *      Hospital Course:   No notes on file     Goals of Care Treatment Preferences:  Code Status: Full Code      Consults:   Consults (From admission, onward)        Status Ordering Provider     Inpatient consult to Social Work  Once        Provider:  (Not yet assigned)    Ordered TAMIE LINDSEY     Inpatient consult to Registered Dietitian/Nutritionist  Once        Provider:  (Not yet assigned)    Completed TAMIE LINDSEY     Inpatient consult to Gastroenterology  Once        Provider:  " "Placido Hernandez MD    Completed MARKER, TAMIE JIMENEZ.          * Nausea and vomiting  -Placed in observation  -Noted general decline in appetite over last month or so with unintentional weight loss and now acute nausea and vomiting x 1.5 days associated with mild LLQ cramping.  -Afebrile with normal WBC, ESR and CRP  -Lipase slightly elevated at 464.  Triglycerides 51.  EtOH <10.  HIV negative.  UA without infection.  UDS positive for THC  -CT Abdomen/Pelvis without any gallstones, pancreatic inflammation, biliary dilation or intestinal inflammation.  -Differential includes pancreatitis, Crohn's flare, gastroenteritis, gastritis and THC associated hyperemisis  -Consulted GI and input appreciated.  No convincing evidence of pancreatitis or Crohn's flare.  They will follow up in clinic  -Patient tolerating solid diet with some mild cramps.  Will trial bentyl and give gentle IV fluids.  -Clinically improved.  Tolerating diet well.  Bentyl helpful with cramps.  -Will discharge home.  F/u with pcp and gastroenterologist.    Unintentional weight loss  -Body mass index is 20.78 kg/m².  -12lb weight loss in last 45 days per patient  -Possibly secondary to crohns and malabsorption  -Consulted nutrition and input appreciated  -Added boost to all meals.    Tobacco abuse  -Counselled on cessation 8 minutes.  -NRT ordered.    Crohn's disease of colon without complication  -In "remission" for many years.  Notes has diarrhea regularly at home  -ESR and CRP are normal  -No evidence of bowel inflammation on CT abd/pelvis  -Follow up with GI in clinic.      Final Active Diagnoses:    Diagnosis Date Noted POA    PRINCIPAL PROBLEM:  Nausea and vomiting [R11.2] 11/11/2022 Yes    Tobacco abuse [Z72.0] 11/11/2022 Yes    Unintentional weight loss [R63.4] 11/11/2022 Yes    Crohn's disease of colon without complication [K50.10] 02/04/2021 Yes      Problems Resolved During this Admission:       Discharged Condition: stable    Disposition: " Home or Self Care    Follow Up:    Patient Instructions:      Diet Adult Regular     Notify your health care provider if you experience any of the following:  increased confusion or weakness     Notify your health care provider if you experience any of the following:  persistent dizziness, light-headedness, or visual disturbances     Notify your health care provider if you experience any of the following:  worsening rash     Notify your health care provider if you experience any of the following:  severe persistent headache     Notify your health care provider if you experience any of the following:  difficulty breathing or increased cough     Notify your health care provider if you experience any of the following:  severe uncontrolled pain     Notify your health care provider if you experience any of the following:  persistent nausea and vomiting or diarrhea     Notify your health care provider if you experience any of the following:  temperature >100.4     Activity as tolerated       Significant Diagnostic Studies: Labs:   BMP:   Recent Labs   Lab 11/11/22  0505   *      K 3.9      CO2 26   BUN 13   CREATININE 0.9   CALCIUM 9.1   MG 2.0   , CMP   Recent Labs   Lab 11/11/22  0505      K 3.9      CO2 26   *   BUN 13   CREATININE 0.9   CALCIUM 9.1   PROT 7.1   ALBUMIN 3.8   BILITOT 0.6   ALKPHOS 62   AST 16   ALT 11   ANIONGAP 9   , CBC   Recent Labs   Lab 11/11/22  0505   WBC 6.38   HGB 15.6   HCT 44.7      , INR No results found for: INR, PROTIME, Lipid Panel   Lab Results   Component Value Date    CHOL 184 11/11/2022    HDL 53 11/11/2022    LDLCALC 120.8 11/11/2022    TRIG 51 11/11/2022    CHOLHDL 28.8 11/11/2022   , Troponin No results for input(s): TROPONINI in the last 168 hours. and A1C: No results for input(s): HGBA1C in the last 4320 hours.    Pending Diagnostic Studies:     Procedure Component Value Units Date/Time    Hemoglobin A1c [[id number]] Collected:  11/11/22 0630    Order Status: Sent Lab Status: In process Updated: 11/11/22 5281    Specimen: Blood          Medications:  Reconciled Home Medications:      Medication List      START taking these medications    dicyclomine 10 MG capsule  Commonly known as: BENTYL  Take 1 capsule (10 mg total) by mouth 4 (four) times daily as needed (prn abdominal cramps).     loperamide 2 mg capsule  Commonly known as: IMODIUM  Take 1 capsule (2 mg total) by mouth 4 (four) times daily as needed for Diarrhea.     nicotine 21 mg/24 hr  Commonly known as: NICODERM CQ  Place 1 patch onto the skin once daily.     ondansetron 4 MG tablet  Commonly known as: ZOFRAN  Take 1 tablet (4 mg total) by mouth every 6 (six) hours as needed for Nausea.        STOP taking these medications    meloxicam 15 MG tablet  Commonly known as: MOBIC     ondansetron 8 MG Tbdl  Commonly known as: ZOFRAN-ODT            Indwelling Lines/Drains at time of discharge:   Lines/Drains/Airways     None                 Time spent on the discharge of patient: 35 minutes         Earnest Pacheco MD  Department of Hospital Medicine  [facility]

## 2022-11-11 NOTE — ASSESSMENT & PLAN NOTE
-Body mass index is 20.78 kg/m².  -12lb weight loss in last 45 days per patient  -Possibly secondary to crohns and malabsorption  -Consulted nutrition and input appreciated  -Added boost to all meals.

## 2022-11-11 NOTE — PLAN OF CARE
[place] - Med Surg  Discharge Final Note    Patient clear to discharge from case management stand point. Hospital follow up for pcp scheduled. GI clinic to contact patient to schedule follow up.     Primary Care Provider: Betzaida Meyer MD    Expected Discharge Date: 11/11/2022    Final Discharge Note (most recent)       Final Note - 11/11/22 1635          Final Note    Assessment Type Final Discharge Note     Anticipated Discharge Disposition Home or Self Care     What phone number can be called within the next 1-3 days to see how you are doing after discharge? [phone]     Hospital Resources/Appts/Education Provided Provided patient/caregiver with written discharge plan information;Appointments scheduled and added to AVS        Post-Acute Status    Discharge Delays None known at this time                     Important Message from Medicare             Contact Info       [facility]   Specialty: Gastroenterology    [address]   [place] [address]   Phone: [phone]       Next Steps: Schedule an appointment as soon as possible for a visit in 1 week(s)    Instructions: Clinic to contact patient to schedule follow up

## 2022-11-11 NOTE — H&P
"[facility]  History & Physical    Patient Name: Jamin Singleton Jr.  MRN: [id number]  Patient Class: OP- Observation  Admission Date: 11/11/2022  Attending Physician: Earnest Pacheco MD  Primary Care Provider: Betzaida Meyer MD         Patient information was obtained from patient, past medical records and ER records.     Subjective:     Principal Problem:Nausea and vomiting    Chief Complaint:   Chief Complaint   Patient presents with    Vomiting     Pt reports nausea & vomiting x 2 days; pt has hx of Crohn's disease but has not had flair up in a couple years; no meds taken for symptoms; denies pain        HPI: Mr. Singleton is a 57 year old man with Crohn's disease and tobacco abuse who presented for evaluation of nausea and vomiting.  He states over the last 1.5 days he has had severe nausea and about 6 episodes of bilious non-bloody vomiting.  Concurrent with this has been some left lower quadrant abdominal discomfort he describes as a "little cramp".  He notes he has had two episodes of diarrhea this week, which are pretty common for him.  He denies fevers but has had some chills.  He notes a gradual decline in his appetite in general over the last several months - but denies any difficulty eating before this acute episode.  He denies any new medications or foods out of his ordinary routine.  He notes an unintentional weight loss of 12lbs in the last 45 days.  He has no right upper quadrant abdominal or back pain.  He denies chest pain, shortness of breath, palpitations and sick contacts.  He states he is hungry and wishes to eat now.      Past Medical History:   Diagnosis Date    Crohn's disease     pt reported    Degenerative disk disease     Disc disease, degenerative, thoracic or thoracolumbar     DJD (degenerative joint disease)     Tobacco abuse        Past Surgical History:   Procedure Laterality Date    KNEE SURGERY  2003       Review of patient's allergies " indicates:  No Known Allergies    Current Facility-Administered Medications on File Prior to Encounter   Medication    triamcinolone acetonide injection 80 mg     Current Outpatient Medications on File Prior to Encounter   Medication Sig    meloxicam (MOBIC) 15 MG tablet Take 1 tablet (15 mg total) by mouth once daily.    ondansetron (ZOFRAN-ODT) 8 MG TbDL DISSOLVE 1 TABLET(8 MG) ON THE TONGUE EVERY 8 HOURS AS NEEDED FOR NAUSEA (Patient not taking: Reported on 10/15/2022)     Family History       Problem Relation (Age of Onset)    No Known Problems Mother, Father          Tobacco Use    Smoking status: Every Day     Packs/day: 1.00     Types: Cigarettes    Smokeless tobacco: Never   Substance and Sexual Activity    Alcohol use: Yes     Comment: occasionally    Drug use: No    Sexual activity: Yes     Partners: Female     Review of Systems   Constitutional:  Positive for appetite change and unexpected weight change. Negative for activity change and fever.   HENT:  Negative for congestion and dental problem.    Eyes:  Negative for discharge and itching.   Respiratory:  Negative for apnea, cough, chest tightness and shortness of breath.    Cardiovascular:  Negative for chest pain and leg swelling.   Gastrointestinal:  Positive for diarrhea, nausea and vomiting. Negative for abdominal distention and abdominal pain.   Endocrine: Negative for cold intolerance and heat intolerance.   Genitourinary:  Negative for difficulty urinating and dysuria.   Musculoskeletal:  Negative for arthralgias and back pain.   Allergic/Immunologic: Negative for environmental allergies and food allergies.   Neurological:  Negative for dizziness, facial asymmetry and light-headedness.   Hematological:  Negative for adenopathy. Does not bruise/bleed easily.   Psychiatric/Behavioral:  Negative for agitation and behavioral problems.    Objective:     Vital Signs (Most Recent):  Temp: 99 °F (37.2 °C) (11/11/22 1308)  Pulse: 60 (11/11/22  1130)  Resp: 18 (11/11/22 1234)  BP: (!) 139/95 (11/11/22 1130)  SpO2: 97 % (11/11/22 1130)   Vital Signs (24h Range):  Temp:  [97 °F (36.1 °C)-99 °F (37.2 °C)] 99 °F (37.2 °C)  Pulse:  [60-70] 60  Resp:  [14-20] 18  SpO2:  [97 %-100 %] 97 %  BP: (134-157)/(77-95) 139/95     Weight: 62 kg (136 lb 11 oz)  Body mass index is 20.78 kg/m².    Physical Exam  Vitals and nursing note reviewed.   Constitutional:       General: He is not in acute distress.     Appearance: He is well-developed. He is not ill-appearing, toxic-appearing or diaphoretic.      Comments: Thin in appearance   HENT:      Head: Normocephalic and atraumatic.      Right Ear: External ear normal.      Left Ear: External ear normal.      Nose: Nose normal.      Mouth/Throat:      Mouth: Mucous membranes are moist.   Eyes:      Extraocular Movements: Extraocular movements intact.      Conjunctiva/sclera: Conjunctivae normal.   Cardiovascular:      Rate and Rhythm: Normal rate and regular rhythm.      Heart sounds: Normal heart sounds.   Pulmonary:      Effort: Pulmonary effort is normal. No respiratory distress.      Breath sounds: Normal breath sounds.   Abdominal:      General: Bowel sounds are normal. There is no distension.      Palpations: Abdomen is soft.      Tenderness: There is no abdominal tenderness. There is no right CVA tenderness, left CVA tenderness or guarding.   Musculoskeletal:         General: Normal range of motion.      Cervical back: Normal range of motion. No rigidity.   Skin:     General: Skin is warm and dry.   Neurological:      Mental Status: He is alert and oriented to person, place, and time.      Cranial Nerves: No cranial nerve deficit.      Coordination: Coordination normal.   Psychiatric:         Behavior: Behavior normal.           Significant Labs: All pertinent labs within the past 24 hours have been reviewed.    Significant Imaging: I have reviewed all pertinent imaging results/findings within the past 24  "hours.    Assessment/Plan:     * Nausea and vomiting  -Placed in observation  -Noted general decline in appetite over last month or so with unintentional weight loss and now acute nausea and vomiting x 1.5 days associated with mild LLQ cramping.  -Afebrile with normal WBC, ESR and CRP  -Lipase slightly elevated at 464.  Triglycerides 51.  EtOH <10.  HIV negative.  UA without infection.  UDS positive for THC  -CT Abdomen/Pelvis without any gallstones, pancreatic inflammation, biliary dilation or intestinal inflammation.  -Differential includes pancreatitis, Crohn's flare, gastroenteritis, gastritis and THC associated hyperemisis  -Consulted GI and input appreciated.  No convincing evidence of pancreatitis or Crohn's flare.  They will follow up in clinic  -Patient tolerating solid diet with some mild cramps.  Will trial bentyl and give gentle IV fluids.  -Hopeful to be able to discharge home today.    Unintentional weight loss  -Body mass index is 20.78 kg/m².  -12lb weight loss in last 45 days per patient  -Possibly secondary to crohns and malabsorption  -Consult nutrition  -Add boost to all meals.    Tobacco abuse  -Counselled on cessation 8 minutes.  -NRT ordered.    Crohn's disease of colon without complication  -In "remission" for many years.  Notes has diarrhea regularly at home  -ESR and CRP are normal  -No evidence of bowel inflammation on CT abd/pelvis  -Follow up with GI in clinic.      VTE Risk Mitigation (From admission, onward)         Ordered     IP VTE LOW RISK PATIENT  Once         11/11/22 0620     Place sequential compression device  Until discontinued         11/11/22 0620                   Earnest Pacheco MD  Department of Hospital Medicine   [facility] - Med Surg  "

## 2022-11-11 NOTE — SUBJECTIVE & OBJECTIVE
Past Medical History:   Diagnosis Date    Crohn's disease     pt reported    Degenerative disk disease     Disc disease, degenerative, thoracic or thoracolumbar     DJD (degenerative joint disease)     Tobacco abuse        Past Surgical History:   Procedure Laterality Date    KNEE SURGERY  2003       Review of patient's allergies indicates:  No Known Allergies    Current Facility-Administered Medications on File Prior to Encounter   Medication    triamcinolone acetonide injection 80 mg     Current Outpatient Medications on File Prior to Encounter   Medication Sig    meloxicam (MOBIC) 15 MG tablet Take 1 tablet (15 mg total) by mouth once daily.    ondansetron (ZOFRAN-ODT) 8 MG TbDL DISSOLVE 1 TABLET(8 MG) ON THE TONGUE EVERY 8 HOURS AS NEEDED FOR NAUSEA (Patient not taking: Reported on 10/15/2022)     Family History       Problem Relation (Age of Onset)    No Known Problems Mother, Father          Tobacco Use    Smoking status: Every Day     Packs/day: 1.00     Types: Cigarettes    Smokeless tobacco: Never   Substance and Sexual Activity    Alcohol use: Yes     Comment: occasionally    Drug use: No    Sexual activity: Yes     Partners: Female     Review of Systems   Constitutional:  Positive for appetite change and unexpected weight change. Negative for activity change and fever.   HENT:  Negative for congestion and dental problem.    Eyes:  Negative for discharge and itching.   Respiratory:  Negative for apnea, cough, chest tightness and shortness of breath.    Cardiovascular:  Negative for chest pain and leg swelling.   Gastrointestinal:  Positive for diarrhea, nausea and vomiting. Negative for abdominal distention and abdominal pain.   Endocrine: Negative for cold intolerance and heat intolerance.   Genitourinary:  Negative for difficulty urinating and dysuria.   Musculoskeletal:  Negative for arthralgias and back pain.   Allergic/Immunologic: Negative for environmental allergies and food allergies.    Neurological:  Negative for dizziness, facial asymmetry and light-headedness.   Hematological:  Negative for adenopathy. Does not bruise/bleed easily.   Psychiatric/Behavioral:  Negative for agitation and behavioral problems.    Objective:     Vital Signs (Most Recent):  Temp: 99 °F (37.2 °C) (11/11/22 1308)  Pulse: 60 (11/11/22 1130)  Resp: 18 (11/11/22 1234)  BP: (!) 139/95 (11/11/22 1130)  SpO2: 97 % (11/11/22 1130)   Vital Signs (24h Range):  Temp:  [97 °F (36.1 °C)-99 °F (37.2 °C)] 99 °F (37.2 °C)  Pulse:  [60-70] 60  Resp:  [14-20] 18  SpO2:  [97 %-100 %] 97 %  BP: (134-157)/(77-95) 139/95     Weight: 62 kg (136 lb 11 oz)  Body mass index is 20.78 kg/m².    Physical Exam  Vitals and nursing note reviewed.   Constitutional:       General: He is not in acute distress.     Appearance: He is well-developed. He is not ill-appearing, toxic-appearing or diaphoretic.      Comments: Thin in appearance   HENT:      Head: Normocephalic and atraumatic.      Right Ear: External ear normal.      Left Ear: External ear normal.      Nose: Nose normal.      Mouth/Throat:      Mouth: Mucous membranes are moist.   Eyes:      Extraocular Movements: Extraocular movements intact.      Conjunctiva/sclera: Conjunctivae normal.   Cardiovascular:      Rate and Rhythm: Normal rate and regular rhythm.      Heart sounds: Normal heart sounds.   Pulmonary:      Effort: Pulmonary effort is normal. No respiratory distress.      Breath sounds: Normal breath sounds.   Abdominal:      General: Bowel sounds are normal. There is no distension.      Palpations: Abdomen is soft.      Tenderness: There is no abdominal tenderness. There is no right CVA tenderness, left CVA tenderness or guarding.   Musculoskeletal:         General: Normal range of motion.      Cervical back: Normal range of motion. No rigidity.   Skin:     General: Skin is warm and dry.   Neurological:      Mental Status: He is alert and oriented to person, place, and time.       Cranial Nerves: No cranial nerve deficit.      Coordination: Coordination normal.   Psychiatric:         Behavior: Behavior normal.           Significant Labs: All pertinent labs within the past 24 hours have been reviewed.    Significant Imaging: I have reviewed all pertinent imaging results/findings within the past 24 hours.

## 2022-11-11 NOTE — ASSESSMENT & PLAN NOTE
-Placed in observation  -Noted general decline in appetite over last month or so with unintentional weight loss and now acute nausea and vomiting x 1.5 days associated with mild LLQ cramping.  -Afebrile with normal WBC, ESR and CRP  -Lipase slightly elevated at 464.  Triglycerides 51.  EtOH <10.  HIV negative.  UA without infection.  UDS positive for THC  -CT Abdomen/Pelvis without any gallstones, pancreatic inflammation, biliary dilation or intestinal inflammation.  -Differential includes pancreatitis, Crohn's flare, gastroenteritis, gastritis and THC associated hyperemisis  -Consulted GI and input appreciated.  No convincing evidence of pancreatitis or Crohn's flare.  They will follow up in clinic  -Patient tolerating solid diet with some mild cramps.  Will trial bentyl and give gentle IV fluids.  -Clinically improved.  Tolerating diet well.  Bentyl helpful with cramps.  -Will discharge home.  F/u with pcp and gastroenterologist.

## 2022-11-14 ENCOUNTER — TELEPHONE (OUTPATIENT)
Dept: FAMILY MEDICINE | Facility: CLINIC | Age: 57
End: 2022-11-14
Payer: COMMERCIAL

## 2022-11-14 DIAGNOSIS — Z12.11 SCREEN FOR COLON CANCER: Primary | ICD-10-CM

## 2022-11-14 NOTE — TELEPHONE ENCOUNTER
----- Message from Margy Bates sent at 11/14/2022  9:28 AM CST -----  Regarding: Return Call  .Type:  Patient Returning Call    Who Called: Self     Who Left Message for Patient: Not sure     Does the patient know what this is regarding?: Referral for colonoscopy     Would the patient rather a call back or a response via [facility]? Call     Best Call Back Number: [phone]      Additional Information:

## 2022-11-14 NOTE — TELEPHONE ENCOUNTER
Placed colonoscopy referral and set appointment to be called. Also provided endo scheduling department phone number.

## 2022-11-15 ENCOUNTER — TELEPHONE (OUTPATIENT)
Dept: FAMILY MEDICINE | Facility: CLINIC | Age: 57
End: 2022-11-15
Payer: COMMERCIAL

## 2022-11-15 NOTE — TELEPHONE ENCOUNTER
Ms Posey was able to get patient scheduled with [facility] to be seen next week, patient verbalized understanding.

## 2022-11-15 NOTE — TELEPHONE ENCOUNTER
Pt has appt on 11/23.2022 @9:00 with Kate Gordon @[facility] pt a given phone number and address.Pt request work excuse for 11/14-15 2022.  Pt advised because he was not seen in the office we cannot issue a work excuse.

## 2022-11-15 NOTE — TELEPHONE ENCOUNTER
----- Message from Riri Fisher sent at 11/15/2022 10:35 AM CST -----  Regarding: self [phone]  Type: Patient Call Back    Who called: self     What is the request in detail: following up his hosp visit he is still having abdominal pain and he has to get back to work.     Can the clinic reply by [facility]? no    Would the patient rather a call back or a response via [facility]? Call back    Best call back number: [phone]

## 2022-11-16 ENCOUNTER — TELEPHONE (OUTPATIENT)
Dept: GASTROENTEROLOGY | Facility: CLINIC | Age: 57
End: 2022-11-16
Payer: COMMERCIAL

## 2022-11-16 ENCOUNTER — OFFICE VISIT (OUTPATIENT)
Dept: GASTROENTEROLOGY | Facility: CLINIC | Age: 57
End: 2022-11-16
Payer: COMMERCIAL

## 2022-11-16 VITALS — HEIGHT: 68 IN | WEIGHT: 132.25 LBS | BODY MASS INDEX: 20.04 KG/M2

## 2022-11-16 DIAGNOSIS — K50.10 CROHN'S DISEASE OF COLON WITHOUT COMPLICATION: ICD-10-CM

## 2022-11-16 DIAGNOSIS — R19.7 DIARRHEA, UNSPECIFIED TYPE: Primary | ICD-10-CM

## 2022-11-16 DIAGNOSIS — R10.9 ABDOMINAL PAIN, UNSPECIFIED ABDOMINAL LOCATION: ICD-10-CM

## 2022-11-16 PROCEDURE — 99999 PR PBB SHADOW E&M-EST. PATIENT-LVL IV: CPT | Mod: PBBFAC,,, | Performed by: STUDENT IN AN ORGANIZED HEALTH CARE EDUCATION/TRAINING PROGRAM

## 2022-11-16 PROCEDURE — 3044F PR MOST RECENT HEMOGLOBIN A1C LEVEL <7.0%: ICD-10-PCS | Mod: CPTII,S$GLB,, | Performed by: STUDENT IN AN ORGANIZED HEALTH CARE EDUCATION/TRAINING PROGRAM

## 2022-11-16 PROCEDURE — 3008F PR BODY MASS INDEX (BMI) DOCUMENTED: ICD-10-PCS | Mod: CPTII,S$GLB,, | Performed by: STUDENT IN AN ORGANIZED HEALTH CARE EDUCATION/TRAINING PROGRAM

## 2022-11-16 PROCEDURE — 3044F HG A1C LEVEL LT 7.0%: CPT | Mod: CPTII,S$GLB,, | Performed by: STUDENT IN AN ORGANIZED HEALTH CARE EDUCATION/TRAINING PROGRAM

## 2022-11-16 PROCEDURE — 99214 PR OFFICE/OUTPT VISIT, EST, LEVL IV, 30-39 MIN: ICD-10-PCS | Mod: S$GLB,,, | Performed by: STUDENT IN AN ORGANIZED HEALTH CARE EDUCATION/TRAINING PROGRAM

## 2022-11-16 PROCEDURE — 1159F MED LIST DOCD IN RCRD: CPT | Mod: CPTII,S$GLB,, | Performed by: STUDENT IN AN ORGANIZED HEALTH CARE EDUCATION/TRAINING PROGRAM

## 2022-11-16 PROCEDURE — 99999 PR PBB SHADOW E&M-EST. PATIENT-LVL IV: ICD-10-PCS | Mod: PBBFAC,,, | Performed by: STUDENT IN AN ORGANIZED HEALTH CARE EDUCATION/TRAINING PROGRAM

## 2022-11-16 PROCEDURE — 99214 OFFICE O/P EST MOD 30 MIN: CPT | Mod: S$GLB,,, | Performed by: STUDENT IN AN ORGANIZED HEALTH CARE EDUCATION/TRAINING PROGRAM

## 2022-11-16 PROCEDURE — 3008F BODY MASS INDEX DOCD: CPT | Mod: CPTII,S$GLB,, | Performed by: STUDENT IN AN ORGANIZED HEALTH CARE EDUCATION/TRAINING PROGRAM

## 2022-11-16 PROCEDURE — 1159F PR MEDICATION LIST DOCUMENTED IN MEDICAL RECORD: ICD-10-PCS | Mod: CPTII,S$GLB,, | Performed by: STUDENT IN AN ORGANIZED HEALTH CARE EDUCATION/TRAINING PROGRAM

## 2022-11-16 RX ORDER — OXYCODONE HYDROCHLORIDE 15 MG/1
10 TABLET ORAL EVERY 4 HOURS PRN
COMMUNITY

## 2022-11-16 RX ORDER — HYOSCYAMINE SULFATE 0.125 MG
125 TABLET ORAL EVERY 4 HOURS PRN
Qty: 60 TABLET | Refills: 11 | Status: SHIPPED | OUTPATIENT
Start: 2022-11-16 | End: 2022-12-16

## 2022-11-16 NOTE — PROGRESS NOTES
[facility] Consultation Note    Reason for Consult:  Hx of Crohn's disease     PCP:   Betzaida Meyer   [address] / [place] [id number]    Referring MD:  Betazida Meyer Md  [address] [address]    HPI:  This is a 57 y.o. male here for folllow up after recent hospital admission for abdominal pain. He was seen by GI service during that time. On that admission, he had developed acute onset of abdominal pain and diarrhea which lasted a few days. Also had NBNB emesis and abdominal pain was mostly in the LLQ. Prior to that admission he was feeling fine. He would have episodes every 1-2 months of loose stools but he denied any blood in stool or pain with those episodes. The pain component of his recent admission was new for him. He also has had weight loss during this time of around 10-15 lbs. States he was diagnosed in 1994 with Crohn's disease but was doing well without any medication for years. Last cscope was over 10 years ago. He thinks his Crohn's disease was only in the colon. No surgeries related to CD. CT scan from recent admission was unremarkable. Labs showed no anemia, leukocytosis. Lipase was elevated to 464. Procal and CRP/SED were wnl. Did not appear to be a Crohn's flare based on lab work and imaging. Additionally, the pancreas appeared normal on imaging. Pain and N/V improved with supportive management and he was able to tolerate diet and was discharged. Plan was for outpatient colonoscopy. He was discharged with bentyl, imodium and zofran.     Since discharge, he is feeling better but still having some abdominal pain. Able to tolerate oral intake. Using bentyl which does help but is short lived. Also has been having frequent urination at night. Recent A1c was wnl and UA without evidence of UTI. No nocturnal stools. Diarrhea is improved. Smokes 1 pack per day, denied ETOH or drug use. Denied hematemesis, dysphagia, melena, BRBPR. Also having some epigastric pain at  times after eating as well as intermittent lower quadrant cramping     ROS:  Constitutional: No fevers, chills, No weight loss  ENT: No allergies  CV: No chest pain  Pulm: No cough, No shortness of breath  Ophtho: No vision changes  GI: see HPI  Derm: No rash  Heme: No lymphadenopathy, No bruising  MSK: No arthritis  : No dysuria, No hematuria  Endo: No hot or cold intolerance  Neuro: No syncope, No seizure  Psych: No anxiety, No depression    Medical History:  has a past medical history of Crohn's disease, Degenerative disk disease, Disc disease, degenerative, thoracic or thoracolumbar, DJD (degenerative joint disease), and Tobacco abuse.    Surgical History:  has a past surgical history that includes Knee surgery (2003).    Family History: family history includes Brain cancer in his paternal grandmother; No Known Problems in his mother..   No contributory family history     Social History:  reports that he has been smoking cigarettes. He has been smoking an average of 1 pack per day. He has never used smokeless tobacco. He reports current alcohol use. He reports that he does not use drugs.    Review of patient's allergies indicates:  No Known Allergies    Current Outpatient Rx   Medication Sig Dispense Refill    dicyclomine (BENTYL) 10 MG capsule Take 1 capsule (10 mg total) by mouth 4 (four) times daily as needed (prn abdominal cramps). 30 capsule 1    ondansetron (ZOFRAN) 4 MG tablet Take 1 tablet (4 mg total) by mouth every 6 (six) hours as needed for Nausea. 30 tablet 1    oxyCODONE (ROXICODONE) 15 MG Tab Take 10 mg by mouth every 4 (four) hours as needed for Pain.      hyoscyamine (ANASPAZ,LEVSIN) 0.125 mg Tab Take 1 tablet (125 mcg total) by mouth every 4 (four) hours as needed (cramping). 60 tablet 11    loperamide (IMODIUM) 2 mg capsule Take 1 capsule (2 mg total) by mouth 4 (four) times daily as needed for Diarrhea. (Patient not taking: Reported on 11/16/2022) 30 capsule 1    nicotine (NICODERM CQ) 21  "mg/24 hr Place 1 patch onto the skin once daily. (Patient not taking: Reported on 11/16/2022) 30 patch 1       Objective Findings:    Vital Signs:  Ht 5' 8" (1.727 m)   Wt 60 kg (132 lb 4.4 oz)   BMI 20.11 kg/m²   Body mass index is 20.11 kg/m².    Physical Exam:  General Appearance: Well appearing in no acute distress  Head:   Normocephalic, without obvious abnormality  Eyes:    No scleral icterus, EOMI  ENT: Neck supple, Lips, mucosa, and tongue normal    Lungs: CTA bilaterally in anterior and posterior fields, no wheezes, no crackles.  Heart:  Regular rate and rhythm, S1, S2 normal, no murmurs heard  Abdomen: Soft, non tender, non distended with positive bowel sounds in all four quadrants. No hepatosplenomegaly, ascites, or mass  Extremities: 2+ pulses, no clubbing, cyanosis or edema  Skin: No rash  Neurologic: no focal deficits       Labs:  Lab Results   Component Value Date    WBC 6.38 11/11/2022    HGB 15.6 11/11/2022    HCT 44.7 11/11/2022     11/11/2022    CHOL 184 11/11/2022    TRIG 51 11/11/2022    HDL 53 11/11/2022    ALT 11 11/11/2022    AST 16 11/11/2022     11/11/2022    K 3.9 11/11/2022     11/11/2022    CREATININE 0.9 11/11/2022    BUN 13 11/11/2022    CO2 26 11/11/2022    PSA 0.68 02/04/2021    HGBA1C 5.5 11/11/2022       Imaging:    CT 11/2022   FINDINGS:  Abdomen:     - Lower thorax:Heart is not enlarged.  No pericardial effusion.     - Lung bases: Mild dependent atelectatic changes.  No infiltrates and no nodules.  No pleural effusion.     - Liver: Liver is normal in size and appears somewhat heterogeneous.  A 0.9 cm hypodense lesion is seen at the left hepatic lobe (series 2, image 19), too small to characterize.  This likely corresponds to the small echogenic lesion seen in the left hepatic lobe on the patient's prior ultrasound examination.  Previous ultrasound suggested that this may reflect a small hemangioma.  Once again, MRI may be considered for further evaluation.  " No other focal abnormality identified within the liver.  Portal vein is patent.     - Gallbladder: No calcified gallstones.     - Bile Ducts: No evidence of intra or extra hepatic biliary ductal dilation.  Common duct measures 0.7 cm (series 601, image 60), similar to prior ultrasound exam.     - Spleen: Negative.     - Kidneys: Kidneys are normal in size, position and contour.  Both kidneys concentrate contrast material.  No definite evidence of renal mass lesion or nephrolithiasis.  No hydronephrosis or hydroureter.  Bladder is within normal limits.     - Adrenals: Unremarkable.     - Pancreas: No mass or peripancreatic fat stranding.     - Retroperitoneum:  No significant adenopathy.     - Vascular: No abdominal aortic aneurysm.  Mild aortoiliac atherosclerosis.  There may be 2 small right renal arteries.     - Abdominal wall:  Unremarkable.     Pelvis:     No pelvic mass, adenopathy, or free fluid.  Prostate calcifications.     Bowel/Mesentery:     Stomach is within normal limits.  No evidence of bowel obstruction or inflammation.  Appendix is within normal limits.     Bones:  No acute osseous abnormality and no suspicious lytic or blastic lesion.  Age-appropriate degenerative changes.  Mild anterior wedging of the T12 vertebral body.     Impression:     1.  No acute abnormality in this patient with history of nausea and vomiting.     2.  A 0.9 cm hypodense lesion is seen at the left hepatic lobe, too small to characterize.  This likely corresponds to the small echogenic lesion seen in this area on the patient's prior ultrasound examination dated 04/01/2019.  Prior ultrasound suggested that this may reflect a small hemangioma.  MRI may be considered for further evaluation.     3.  Additional findings including mild aortoiliac atherosclerosis, what appear to be 2 small right renal arteries, prostate calcifications, and DJD with mild anterior wedging of the T12 vertebral body.       Endoscopy:  none      Assessment:    Abdominal Pain  Hx of Crohn's disease   Elevated Lipase   Weight loss     Patient with recent hospital admission for acute onset N/V and diarrhea. Afebrile. No leukocytosis or anemia. CT scan was unremarkable except for small lesion in liver. Lipase was elevated to 400s but pancreas appears normal. Not consistent with acute pancreatitis. Also did not appear to be in Crohn's flare with normal inflammatory markers and CT scan of bowel. Lipase may be related to viral gastroenteritis which he is getting over now. Will plan for EGD and cscope for evaluation of possible Crohn's disease and also due to weight loss and abdominal pain. Last cscope was over 10 years ago. Changed to levsin due to frequent urination complaint to see if this also helps that along with his cramping.     Recommendations:    - Continue zofran, levsin PRN for pain   - Plan for EGD and cscope for further evaluation  - Adequate hydration discussed   - Check calprotectin, fecal elastase today   - Further plan based on endoscopy results and stool testing     Follow up for after endoscopy .      Order summary:  Orders Placed This Encounter    Calprotectin, Stool    Pancreatic elastase, fecal    Ambulatory referral/consult to Endo Procedure     hyoscyamine (ANASPAZ,LEVSIN) 0.125 mg Tab         Thank you so much for allowing me to participate in the care of Jamin Cabrera MD  Gastroenterology   [facility]

## 2022-11-17 ENCOUNTER — LAB VISIT (OUTPATIENT)
Dept: LAB | Facility: HOSPITAL | Age: 57
End: 2022-11-17
Attending: STUDENT IN AN ORGANIZED HEALTH CARE EDUCATION/TRAINING PROGRAM
Payer: COMMERCIAL

## 2022-11-17 DIAGNOSIS — R19.7 DIARRHEA, UNSPECIFIED TYPE: ICD-10-CM

## 2022-11-17 PROCEDURE — 82653 EL-1 FECAL QUANTITATIVE: CPT | Performed by: STUDENT IN AN ORGANIZED HEALTH CARE EDUCATION/TRAINING PROGRAM

## 2022-11-17 PROCEDURE — 83993 ASSAY FOR CALPROTECTIN FECAL: CPT | Performed by: STUDENT IN AN ORGANIZED HEALTH CARE EDUCATION/TRAINING PROGRAM

## 2022-11-19 LAB — ELASTASE 1, FECAL: 364 MCG/G

## 2022-11-23 LAB — CALPROTECTIN STL-MCNT: <27.1 MCG/G

## 2022-11-29 ENCOUNTER — TELEPHONE (OUTPATIENT)
Dept: GASTROENTEROLOGY | Facility: CLINIC | Age: 57
End: 2022-11-29
Payer: COMMERCIAL

## 2022-11-29 DIAGNOSIS — R11.0 NAUSEA: Primary | ICD-10-CM

## 2022-11-29 RX ORDER — SCOLOPAMINE TRANSDERMAL SYSTEM 1 MG/1
1 PATCH, EXTENDED RELEASE TRANSDERMAL
Qty: 2 PATCH | Refills: 0 | Status: SHIPPED | OUTPATIENT
Start: 2022-11-29 | End: 2022-12-06

## 2022-11-29 NOTE — TELEPHONE ENCOUNTER
MA spoke with patient.     Mr. Singleton is calling with complaints of severe nausea. Per patient, he has taken 10 Zofran tablets this morning.     He is requesting a stronger strength for Zofran and a sooner date to be contacted to schedule his endoscopy.

## 2022-12-23 ENCOUNTER — CLINICAL SUPPORT (OUTPATIENT)
Dept: ENDOSCOPY | Facility: HOSPITAL | Age: 57
End: 2022-12-23
Attending: FAMILY MEDICINE
Payer: COMMERCIAL

## 2022-12-23 VITALS — WEIGHT: 131 LBS | BODY MASS INDEX: 19.85 KG/M2 | HEIGHT: 68 IN

## 2022-12-23 DIAGNOSIS — R10.9 ABDOMINAL PAIN, UNSPECIFIED ABDOMINAL LOCATION: ICD-10-CM

## 2022-12-23 DIAGNOSIS — Z12.11 SCREEN FOR COLON CANCER: ICD-10-CM

## 2022-12-23 DIAGNOSIS — R19.7 DIARRHEA, UNSPECIFIED TYPE: Primary | ICD-10-CM

## 2022-12-23 RX ORDER — POLYETHYLENE GLYCOL 3350, SODIUM SULFATE ANHYDROUS, SODIUM BICARBONATE, SODIUM CHLORIDE, POTASSIUM CHLORIDE 236; 22.74; 6.74; 5.86; 2.97 G/4L; G/4L; G/4L; G/4L; G/4L
4 POWDER, FOR SOLUTION ORAL ONCE
Qty: 4000 ML | Refills: 0 | Status: SHIPPED | OUTPATIENT
Start: 2022-12-23 | End: 2022-12-23

## 2023-01-03 ENCOUNTER — TELEPHONE (OUTPATIENT)
Dept: ENDOSCOPY | Facility: HOSPITAL | Age: 58
End: 2023-01-03
Payer: COMMERCIAL

## 2023-01-05 ENCOUNTER — ANESTHESIA EVENT (OUTPATIENT)
Dept: ENDOSCOPY | Facility: HOSPITAL | Age: 58
End: 2023-01-05
Payer: COMMERCIAL

## 2023-01-05 RX ORDER — LIDOCAINE HYDROCHLORIDE 10 MG/ML
1 INJECTION, SOLUTION EPIDURAL; INFILTRATION; INTRACAUDAL; PERINEURAL ONCE
Status: CANCELLED | OUTPATIENT
Start: 2023-01-05 | End: 2023-01-05

## 2023-01-06 ENCOUNTER — ANESTHESIA (OUTPATIENT)
Dept: ENDOSCOPY | Facility: HOSPITAL | Age: 58
End: 2023-01-06
Payer: COMMERCIAL

## 2023-01-06 ENCOUNTER — HOSPITAL ENCOUNTER (OUTPATIENT)
Facility: HOSPITAL | Age: 58
Discharge: HOME | End: 2023-01-06
Attending: STUDENT IN AN ORGANIZED HEALTH CARE EDUCATION/TRAINING PROGRAM | Admitting: STUDENT IN AN ORGANIZED HEALTH CARE EDUCATION/TRAINING PROGRAM
Payer: COMMERCIAL

## 2023-01-06 VITALS
RESPIRATION RATE: 16 BRPM | HEART RATE: 75 BPM | OXYGEN SATURATION: 98 % | DIASTOLIC BLOOD PRESSURE: 87 MMHG | TEMPERATURE: 97 F | SYSTOLIC BLOOD PRESSURE: 144 MMHG

## 2023-01-06 DIAGNOSIS — R19.7 DIARRHEA, UNSPECIFIED TYPE: ICD-10-CM

## 2023-01-06 PROCEDURE — 27201012 HC FORCEPS, HOT/COLD, DISP: Performed by: STUDENT IN AN ORGANIZED HEALTH CARE EDUCATION/TRAINING PROGRAM

## 2023-01-06 PROCEDURE — 63600175 PHARM REV CODE 636 W HCPCS: Performed by: NURSE ANESTHETIST, CERTIFIED REGISTERED

## 2023-01-06 PROCEDURE — 88342 CHG IMMUNOCYTOCHEMISTRY: ICD-10-PCS | Mod: 26,,, | Performed by: STUDENT IN AN ORGANIZED HEALTH CARE EDUCATION/TRAINING PROGRAM

## 2023-01-06 PROCEDURE — 45380 PR COLONOSCOPY,BIOPSY: ICD-10-PCS | Mod: 59,,, | Performed by: STUDENT IN AN ORGANIZED HEALTH CARE EDUCATION/TRAINING PROGRAM

## 2023-01-06 PROCEDURE — 43239 EGD BIOPSY SINGLE/MULTIPLE: CPT | Mod: 51,,, | Performed by: STUDENT IN AN ORGANIZED HEALTH CARE EDUCATION/TRAINING PROGRAM

## 2023-01-06 PROCEDURE — 88305 TISSUE EXAM BY PATHOLOGIST: CPT | Mod: 26,,, | Performed by: STUDENT IN AN ORGANIZED HEALTH CARE EDUCATION/TRAINING PROGRAM

## 2023-01-06 PROCEDURE — 43239 PR EGD, FLEX, W/BIOPSY, SGL/MULTI: ICD-10-PCS | Mod: 51,,, | Performed by: STUDENT IN AN ORGANIZED HEALTH CARE EDUCATION/TRAINING PROGRAM

## 2023-01-06 PROCEDURE — D9220A PRA ANESTHESIA: ICD-10-PCS | Mod: CRNA,,, | Performed by: NURSE ANESTHETIST, CERTIFIED REGISTERED

## 2023-01-06 PROCEDURE — 45380 COLONOSCOPY AND BIOPSY: CPT | Mod: 59 | Performed by: STUDENT IN AN ORGANIZED HEALTH CARE EDUCATION/TRAINING PROGRAM

## 2023-01-06 PROCEDURE — 88305 TISSUE EXAM BY PATHOLOGIST: CPT | Performed by: STUDENT IN AN ORGANIZED HEALTH CARE EDUCATION/TRAINING PROGRAM

## 2023-01-06 PROCEDURE — D9220A PRA ANESTHESIA: Mod: ANES,,, | Performed by: ANESTHESIOLOGY

## 2023-01-06 PROCEDURE — 25000003 PHARM REV CODE 250: Performed by: NURSE ANESTHETIST, CERTIFIED REGISTERED

## 2023-01-06 PROCEDURE — 88305 TISSUE EXAM BY PATHOLOGIST: ICD-10-PCS | Mod: 26,,, | Performed by: STUDENT IN AN ORGANIZED HEALTH CARE EDUCATION/TRAINING PROGRAM

## 2023-01-06 PROCEDURE — 37000008 HC ANESTHESIA 1ST 15 MINUTES: Performed by: STUDENT IN AN ORGANIZED HEALTH CARE EDUCATION/TRAINING PROGRAM

## 2023-01-06 PROCEDURE — 27201089 HC SNARE, DISP (ANY): Performed by: STUDENT IN AN ORGANIZED HEALTH CARE EDUCATION/TRAINING PROGRAM

## 2023-01-06 PROCEDURE — D9220A PRA ANESTHESIA: ICD-10-PCS | Mod: ANES,,, | Performed by: ANESTHESIOLOGY

## 2023-01-06 PROCEDURE — 37000009 HC ANESTHESIA EA ADD 15 MINS: Performed by: STUDENT IN AN ORGANIZED HEALTH CARE EDUCATION/TRAINING PROGRAM

## 2023-01-06 PROCEDURE — 43239 EGD BIOPSY SINGLE/MULTIPLE: CPT | Performed by: STUDENT IN AN ORGANIZED HEALTH CARE EDUCATION/TRAINING PROGRAM

## 2023-01-06 PROCEDURE — D9220A PRA ANESTHESIA: Mod: CRNA,,, | Performed by: NURSE ANESTHETIST, CERTIFIED REGISTERED

## 2023-01-06 PROCEDURE — 45385 COLONOSCOPY W/LESION REMOVAL: CPT | Performed by: STUDENT IN AN ORGANIZED HEALTH CARE EDUCATION/TRAINING PROGRAM

## 2023-01-06 PROCEDURE — 45385 COLONOSCOPY W/LESION REMOVAL: CPT | Mod: ,,, | Performed by: STUDENT IN AN ORGANIZED HEALTH CARE EDUCATION/TRAINING PROGRAM

## 2023-01-06 PROCEDURE — 88342 IMHCHEM/IMCYTCHM 1ST ANTB: CPT | Mod: 26,,, | Performed by: STUDENT IN AN ORGANIZED HEALTH CARE EDUCATION/TRAINING PROGRAM

## 2023-01-06 PROCEDURE — 45380 COLONOSCOPY AND BIOPSY: CPT | Mod: 59,,, | Performed by: STUDENT IN AN ORGANIZED HEALTH CARE EDUCATION/TRAINING PROGRAM

## 2023-01-06 PROCEDURE — 45385 PR COLONOSCOPY,REMV LESN,SNARE: ICD-10-PCS | Mod: ,,, | Performed by: STUDENT IN AN ORGANIZED HEALTH CARE EDUCATION/TRAINING PROGRAM

## 2023-01-06 RX ORDER — PROPOFOL 10 MG/ML
INJECTION, EMULSION INTRAVENOUS
Status: DISCONTINUED
Start: 2023-01-06 | End: 2023-01-06 | Stop reason: HOSPADM

## 2023-01-06 RX ORDER — SODIUM CHLORIDE 9 MG/ML
INJECTION, SOLUTION INTRAVENOUS CONTINUOUS
Status: DISCONTINUED | OUTPATIENT
Start: 2023-01-06 | End: 2023-01-06 | Stop reason: HOSPADM

## 2023-01-06 RX ORDER — PROPOFOL 10 MG/ML
INJECTION, EMULSION INTRAVENOUS
Status: DISCONTINUED | OUTPATIENT
Start: 2023-01-06 | End: 2023-01-06

## 2023-01-06 RX ORDER — LIDOCAINE HYDROCHLORIDE 20 MG/ML
INJECTION, SOLUTION EPIDURAL; INFILTRATION; INTRACAUDAL; PERINEURAL
Status: DISCONTINUED
Start: 2023-01-06 | End: 2023-01-06 | Stop reason: HOSPADM

## 2023-01-06 RX ORDER — LIDOCAINE HCL/PF 100 MG/5ML
SYRINGE (ML) INTRAVENOUS
Status: DISCONTINUED | OUTPATIENT
Start: 2023-01-06 | End: 2023-01-06

## 2023-01-06 RX ADMIN — PROPOFOL 50 MG: 10 INJECTION, EMULSION INTRAVENOUS at 12:01

## 2023-01-06 RX ADMIN — PROPOFOL 30 MG: 10 INJECTION, EMULSION INTRAVENOUS at 12:01

## 2023-01-06 RX ADMIN — SODIUM CHLORIDE: 0.9 INJECTION, SOLUTION INTRAVENOUS at 12:01

## 2023-01-06 RX ADMIN — LIDOCAINE HYDROCHLORIDE 100 MG: 20 INJECTION, SOLUTION INTRAVENOUS at 12:01

## 2023-01-06 NOTE — ANESTHESIA PREPROCEDURE EVALUATION
"                                                                                                             01/06/2023  Jamin Singleton Jr. is a 57 y.o., male.  Pre-operative evaluation for Procedure(s) (LRB):  EGD (ESOPHAGOGASTRODUODENOSCOPY) (N/A)  COLONOSCOPY (N/A)    NPO >8 food; 2h prep, "sipping" water since 0800 with last sip on arrival (4 oz out on 17 oz water bottle at bedside).  METS >4        Patient Active Problem List   Diagnosis    Crohn's disease of colon without complication    Chronic bilateral low back pain without sciatica    Palpitations    Syncope and collapse    Chest pain, atypical    Nausea and vomiting    Tobacco abuse    Unintentional weight loss       Review of patient's allergies indicates:  No Known Allergies    Current Facility-Administered Medications on File Prior to Encounter   Medication Dose Route Frequency Provider Last Rate Last Admin    triamcinolone acetonide injection 80 mg  80 mg Intra-articular  Deb Soliman MD   80 mg at 10/26/22 1315     Current Outpatient Medications on File Prior to Encounter   Medication Sig Dispense Refill    hyoscyamine (ANASPAZ,LEVSIN) 0.125 mg Tab Take 1 tablet (125 mcg total) by mouth every 4 (four) hours as needed (cramping). 60 tablet 11    nicotine (NICODERM CQ) 21 mg/24 hr Place 1 patch onto the skin once daily. (Patient not taking: Reported on 11/16/2022) 30 patch 1    ondansetron (ZOFRAN) 4 MG tablet Take 1 tablet (4 mg total) by mouth every 6 (six) hours as needed for Nausea. 30 tablet 1    oxyCODONE (ROXICODONE) 15 MG Tab Take 10 mg by mouth every 4 (four) hours as needed for Pain.         Past Surgical History:   Procedure Laterality Date    KNEE SURGERY  2003       Social History     Socioeconomic History    Marital status: Single   Tobacco Use    Smoking status: Every Day     Packs/day: 1.00     Types: Cigarettes    Smokeless tobacco: Never   Substance and Sexual Activity    Alcohol use: Yes     Comment: " occasionally    Drug use: No    Sexual activity: Yes     Partners: Female       Lab Results   Component Value Date    WBC 6.38 11/11/2022    HGB 15.6 11/11/2022    HCT 44.7 11/11/2022    MCV 89 11/11/2022     11/11/2022       CMP  Sodium   Date Value Ref Range Status   11/11/2022 137 136 - 145 mmol/L Final     Potassium   Date Value Ref Range Status   11/11/2022 3.9 3.5 - 5.1 mmol/L Final     Chloride   Date Value Ref Range Status   11/11/2022 102 95 - 110 mmol/L Final     CO2   Date Value Ref Range Status   11/11/2022 26 23 - 29 mmol/L Final     Glucose   Date Value Ref Range Status   11/11/2022 127 (H) 70 - 110 mg/dL Final     BUN   Date Value Ref Range Status   11/11/2022 13 6 - 20 mg/dL Final     Creatinine   Date Value Ref Range Status   11/11/2022 0.9 0.5 - 1.4 mg/dL Final     Calcium   Date Value Ref Range Status   11/11/2022 9.1 8.7 - 10.5 mg/dL Final     Total Protein   Date Value Ref Range Status   11/11/2022 7.1 6.0 - 8.4 g/dL Final     Albumin   Date Value Ref Range Status   11/11/2022 3.8 3.5 - 5.2 g/dL Final     Total Bilirubin   Date Value Ref Range Status   11/11/2022 0.6 0.1 - 1.0 mg/dL Final     Comment:     For infants and newborns, interpretation of results should be based  on gestational age, weight and in agreement with clinical  observations.    Premature Infant recommended reference ranges:  Up to 24 hours.............<8.0 mg/dL  Up to 48 hours............<12.0 mg/dL  3-5 days..................<15.0 mg/dL  6-29 days.................<15.0 mg/dL       Alkaline Phosphatase   Date Value Ref Range Status   11/11/2022 62 55 - 135 U/L Final     AST   Date Value Ref Range Status   11/11/2022 16 10 - 40 U/L Final     ALT   Date Value Ref Range Status   11/11/2022 11 10 - 44 U/L Final     Anion Gap   Date Value Ref Range Status   11/11/2022 9 8 - 16 mmol/L Final     eGFR   Date Value Ref Range Status   11/11/2022 >60 >60 mL/min/1.73 m^2 Final       Diagnostic Studies:      EKG:  Vent. Rate :  083 BPM     Atrial Rate : 083 BPM      P-R Int : 168 ms          QRS Dur : 128 ms       QT Int : 376 ms       P-R-T Axes : 077 088 065 degrees      QTc Int : 441 ms     Normal sinus rhythm   Possible Left atrial enlargement   Right bundle branch block   Abnormal ECG   When compared with ECG of 08-JUL-2021 08:19,   Significant changes have occurred   Confirmed by Jose Lanier MD ([id number]) on 7/15/2021 7:37:19 PM     2D Echo:  2021   The left ventricle is normal in size with concentric remodeling and normal systolic function.   The estimated ejection fraction is 60%.   Normal right ventricular size with normal right ventricular systolic function.   The sinuses of Valsalva is mildly dilated, 4.0cm.   Indeterminate central venous pressure. Estimated PA systolic pressure is at least 13 mmHg.    No results found for this or any previous visit.      Pre-op Assessment    I have reviewed the Patient Summary Reports.    I have reviewed the NPO Status.   I have reviewed the Medications.     Review of Systems  Anesthesia Hx:  No problems with previous Anesthesia  History of prior surgery of interest to airway management or planning: Denies Family Hx of Anesthesia complications.   Denies Personal Hx of Anesthesia complications.   Social:  Alcohol Use, Smoker    Hematology/Oncology:  Hematology Normal        EENT/Dental:EENT/Dental Normal   Cardiovascular:  Cardiovascular Normal Exercise tolerance: good  ECG has been reviewed.    Hepatic/GI:   Bowel Prep. Chron's disease   Musculoskeletal:   Arthritis   Spine Disorders: lumbar Degenerative disease and Chronic Pain    Neurological:  Neurology Normal    Endocrine:  Endocrine Normal        Physical Exam  General: Cooperative, Alert, Oriented and Cachexia    Airway:  Mallampati: III   Mouth Opening: Normal  TM Distance: > 6 cm  Tongue: Normal  Neck ROM: Normal ROM    Dental:  Intact, Partial Dentures  Lower partial      Anesthesia Plan  Type of Anesthesia, risks & benefits  discussed:    Anesthesia Type: Gen ETT, Gen Natural Airway  Intra-op Monitoring Plan: Standard ASA Monitors  Induction:  IV  Informed Consent: Informed consent signed with the Patient and all parties understand the risks and agree with anesthesia plan.  All questions answered.   ASA Score: 3  Anesthesia Plan Notes: Dr. Melo aware of improper NPO status, not large volume of water so OK to proceed and discussed airway management with both Pt and GI doc possible.     Ready For Surgery From Anesthesia Perspective.     .

## 2023-01-06 NOTE — PROVATION PATIENT INSTRUCTIONS
Discharge Summary/Instructions after an Endoscopic Procedure  Patient Name: Jamin Singleton  Patient MRN: [id number]  Patient YOB: 1965 Friday, January 6, 2023  Erasmo Gant MD  Dear patient,  As a result of recent federal legislation (The Federal Cures Act), you may   receive lab or pathology results from your procedure in your [facility]   account before your physician is able to contact you. Your physician or   their representative will relay the results to you with their   recommendations at their soonest availability.  Thank you,  RESTRICTIONS:  During your procedure today, you received medications for sedation.  These   medications may affect your judgment, balance and coordination.  Therefore,   for 24 hours, you have the following restrictions:   - DO NOT drive a car, operate machinery, make legal/financial decisions,   sign important papers or drink alcohol.    ACTIVITY:  Today: no heavy lifting, straining or running due to procedural   sedation/anesthesia.  The following day: return to full activity including work.  DIET:  Eat and drink normally unless instructed otherwise.     TREATMENT FOR COMMON SIDE EFFECTS:  - Mild abdominal pain, nausea, belching, bloating or excessive gas:  rest,   eat lightly and use a heating pad.  - Sore Throat: treat with throat lozenges and/or gargle with warm salt   water.  - Because air was used during the procedure, expelling large amounts of air   from your rectum or belching is normal.  - If a bowel prep was taken, you may not have a bowel movement for 1-3 days.    This is normal.  SYMPTOMS TO WATCH FOR AND REPORT TO YOUR PHYSICIAN:  1. Abdominal pain or bloating, other than gas cramps.  2. Chest pain.  3. Back pain.  4. Signs of infection such as: chills or fever occurring within 24 hours   after the procedure.  5. Rectal bleeding, which would show as bright red, maroon, or black stools.   (A tablespoon of blood from the rectum is not serious,  especially if   hemorrhoids are present.)  6. Vomiting.  7. Weakness or dizziness.  GO DIRECTLY TO THE NEAREST EMERGENCY ROOM IF YOU HAVE ANY OF THE FOLLOWING:      Difficulty breathing              Chills and/or fever over 101 F   Persistent vomiting and/or vomiting blood   Severe abdominal pain   Severe chest pain   Black, tarry stools   Bleeding- more than one tablespoon   Any other symptom or condition that you feel may need urgent attention  Your doctor recommends these additional instructions:  If any biopsies were taken, your doctors clinic will contact you in 1 to 2   weeks with any results.  - Discharge patient to home.   - Await pathology results.   - Repeat colonoscopy in 7 years for surveillance.   - Patient has a contact number available for emergencies.  The signs and   symptoms of potential delayed complications were discussed with the   patient.  Return to normal activities tomorrow.  Written discharge   instructions were provided to the patient.  For questions, problems or results please call your physician - Erasmo Gant MD at Work:  [phone].  [facility] can be reached at [phone]     IF A COMPLICATION OR EMERGENCY SITUATION ARISES AND YOU ARE UNABLE TO REACH   YOUR PHYSICIAN - GO DIRECTLY TO THE EMERGENCY ROOM.  Erasmo Gant MD  1/6/2023 12:53:06 PM  This report has been verified and signed electronically.  Dear patient,  As a result of recent federal legislation (The Federal Cures Act), you may   receive lab or pathology results from your procedure in your [facility]   account before your physician is able to contact you. Your physician or   their representative will relay the results to you with their   recommendations at their soonest availability.  Thank you,  PROVATION

## 2023-01-06 NOTE — PLAN OF CARE
Procedure and recovery completed without complication. Pt AAOx4, in NAD, vitals recovered to baseline. Pt discussed case with midlevel provider. Discharge instructions reviewed, pt verbalizes understanding.

## 2023-01-06 NOTE — TRANSFER OF CARE
Anesthesia Transfer of Care Note    Patient: Jamin Singleton Jr.    Procedure(s) Performed: Procedure(s) (LRB):  EGD (ESOPHAGOGASTRODUODENOSCOPY) (N/A)  COLONOSCOPY (N/A)    Patient location: GI    Anesthesia Type: MAC    Transport from OR: Transported from OR on room air with adequate spontaneous ventilation    Post pain: adequate analgesia    Post assessment: no apparent anesthetic complications and tolerated procedure well    Post vital signs: stable    Level of consciousness: sedated    Nausea/Vomiting: no nausea/vomiting    Complications: none    Transfer of care protocol was followed      Last vitals:   Visit Vitals  /69 (BP Location: Left arm, Patient Position: Lying)   Pulse 76   Temp 36.1 °C (97 °F) (Axillary)   Resp 16   SpO2 96%

## 2023-01-06 NOTE — ANESTHESIA POSTPROCEDURE EVALUATION
Anesthesia Post Evaluation    Patient: Jamin Singleton Jr.    Procedure(s) Performed: Procedure(s) (LRB):  EGD (ESOPHAGOGASTRODUODENOSCOPY) (N/A)  COLONOSCOPY (N/A)    Final Anesthesia Type: general      Patient location during evaluation: GI PACU  Patient participation: Yes- Able to Participate  Level of consciousness: awake and alert  Post-procedure vital signs: reviewed and stable  Pain management: adequate  Airway patency: patent    PONV status at discharge: No PONV  Anesthetic complications: no      Cardiovascular status: blood pressure returned to baseline  Respiratory status: unassisted and spontaneous ventilation  Hydration status: euvolemic  Follow-up not needed.          Vitals Value Taken Time   /95 01/06/23 1323   Temp 36.1 °C (97 °F) 01/06/23 1248   Pulse 68 01/06/23 1327   Resp 28 01/06/23 1327   SpO2 100 % 01/06/23 1327   Vitals shown include unvalidated device data.      Event Time   Out of Recovery 13:18:00         Pain/Mariya Score: Mariya Score: 9 (1/6/2023  1:18 PM)

## 2023-01-06 NOTE — PROVATION PATIENT INSTRUCTIONS
Discharge Summary/Instructions after an Endoscopic Procedure  Patient Name: Jamin Singleton  Patient MRN: [id number]  Patient YOB: 1965 Friday, January 6, 2023  Erasmo Gant MD  Dear patient,  As a result of recent federal legislation (The Federal Cures Act), you may   receive lab or pathology results from your procedure in your [facility]   account before your physician is able to contact you. Your physician or   their representative will relay the results to you with their   recommendations at their soonest availability.  Thank you,  RESTRICTIONS:  During your procedure today, you received medications for sedation.  These   medications may affect your judgment, balance and coordination.  Therefore,   for 24 hours, you have the following restrictions:   - DO NOT drive a car, operate machinery, make legal/financial decisions,   sign important papers or drink alcohol.    ACTIVITY:  Today: no heavy lifting, straining or running due to procedural   sedation/anesthesia.  The following day: return to full activity including work.  DIET:  Eat and drink normally unless instructed otherwise.     TREATMENT FOR COMMON SIDE EFFECTS:  - Mild abdominal pain, nausea, belching, bloating or excessive gas:  rest,   eat lightly and use a heating pad.  - Sore Throat: treat with throat lozenges and/or gargle with warm salt   water.  - Because air was used during the procedure, expelling large amounts of air   from your rectum or belching is normal.  - If a bowel prep was taken, you may not have a bowel movement for 1-3 days.    This is normal.  SYMPTOMS TO WATCH FOR AND REPORT TO YOUR PHYSICIAN:  1. Abdominal pain or bloating, other than gas cramps.  2. Chest pain.  3. Back pain.  4. Signs of infection such as: chills or fever occurring within 24 hours   after the procedure.  5. Rectal bleeding, which would show as bright red, maroon, or black stools.   (A tablespoon of blood from the rectum is not serious,  especially if   hemorrhoids are present.)  6. Vomiting.  7. Weakness or dizziness.  GO DIRECTLY TO THE NEAREST EMERGENCY ROOM IF YOU HAVE ANY OF THE FOLLOWING:      Difficulty breathing              Chills and/or fever over 101 F   Persistent vomiting and/or vomiting blood   Severe abdominal pain   Severe chest pain   Black, tarry stools   Bleeding- more than one tablespoon   Any other symptom or condition that you feel may need urgent attention  Your doctor recommends these additional instructions:  If any biopsies were taken, your doctors clinic will contact you in 1 to 2   weeks with any results.  - Discharge patient to home.   - Patient has a contact number available for emergencies.  The signs and   symptoms of potential delayed complications were discussed with the   patient.  Return to normal activities tomorrow.  Written discharge   instructions were provided to the patient.   - The findings and recommendations were discussed with the patient.   - Await pathology results.  For questions, problems or results please call your physician - Erasmo Gant MD at Work:  [phone].  [facility] can be reached at [phone]     IF A COMPLICATION OR EMERGENCY SITUATION ARISES AND YOU ARE UNABLE TO REACH   YOUR PHYSICIAN - GO DIRECTLY TO THE EMERGENCY ROOM.  Erasmo Gant MD  1/6/2023 12:50:43 PM  This report has been verified and signed electronically.  Dear patient,  As a result of recent federal legislation (The Federal Cures Act), you may   receive lab or pathology results from your procedure in your [facility]   account before your physician is able to contact you. Your physician or   their representative will relay the results to you with their   recommendations at their soonest availability.  Thank you,  PROVATION

## 2023-01-09 NOTE — H&P
Short Stay Endoscopy History and Physical    PCP - Betzaida Meyer MD  Referring Physician - PRE-ADMIT, ENDO -[facility]  No address on file    Procedure - EGD and Colonoscopy  ASA - per anesthesia  Mallampati - per anesthesia  History of Anesthesia problems - no  Family history Anesthesia problems -  no   Plan of anesthesia - General    HPI  57 y.o. male    Reason for procedure:   Diarrhea, unspecified type [R19.7]  Abdominal pain, unspecified abdominal location [R10.9]      ROS:  Constitutional: No fevers, chills, No weight loss  CV: No chest pain  Pulm: No cough, No shortness of breath  GI: see HPI    Medical History:  has a past medical history of Crohn's disease, Degenerative disk disease, Disc disease, degenerative, thoracic or thoracolumbar, DJD (degenerative joint disease), and Tobacco abuse.    Surgical History:  has a past surgical history that includes Knee surgery (2003); Esophagogastroduodenoscopy (N/A, 1/6/2023); and Colonoscopy (N/A, 1/6/2023).    Family History: family history includes Brain cancer in his paternal grandmother; No Known Problems in his mother.    Social History:  reports that he has been smoking cigarettes. He has been smoking an average of 1 pack per day. He has never used smokeless tobacco. He reports current alcohol use. He reports that he does not use drugs.    Review of patient's allergies indicates:  No Known Allergies    Medications:   No medications prior to admission.       Physical Exam:    Vital Signs:   Vitals:    01/06/23 1318   BP: (!) 144/87   Pulse: 75   Resp: 16   Temp:        General Appearance: Well appearing in no acute distress  Abdomen: Soft, non tender, non distended with normal bowel sounds, no masses    Labs:  Lab Results   Component Value Date    WBC 6.38 11/11/2022    HGB 15.6 11/11/2022    HCT 44.7 11/11/2022     11/11/2022    CHOL 184 11/11/2022    TRIG 51 11/11/2022    HDL 53 11/11/2022    ALT 11 11/11/2022    AST 16 11/11/2022      11/11/2022    K 3.9 11/11/2022     11/11/2022    CREATININE 0.9 11/11/2022    BUN 13 11/11/2022    CO2 26 11/11/2022    PSA 0.68 02/04/2021    HGBA1C 5.5 11/11/2022       I have explained the risks and benefits of this endoscopic procedure to the patient including but not limited to bleeding, inflammation, infection, perforation, and death.      Erasmo Gant MD

## 2023-01-13 LAB
FINAL PATHOLOGIC DIAGNOSIS: NORMAL
Lab: NORMAL

## 2023-02-08 ENCOUNTER — CLINICAL SUPPORT (OUTPATIENT)
Dept: ENDOSCOPY | Facility: HOSPITAL | Age: 58
End: 2023-02-08
Attending: STUDENT IN AN ORGANIZED HEALTH CARE EDUCATION/TRAINING PROGRAM
Payer: COMMERCIAL

## 2023-02-08 DIAGNOSIS — R10.9 ABDOMINAL PAIN, UNSPECIFIED ABDOMINAL LOCATION: ICD-10-CM

## 2023-02-08 DIAGNOSIS — R19.7 DIARRHEA, UNSPECIFIED TYPE: ICD-10-CM

## 2023-02-08 NOTE — PLAN OF CARE
Attempted contact to confirm with patient that procedures had already been done on 1/6/23. Left message that he no longer needed this appointment.

## 2024-03-19 ENCOUNTER — HOSPITAL ENCOUNTER (EMERGENCY)
Facility: HOSPITAL | Age: 59
Discharge: HOME | End: 2024-03-19
Attending: EMERGENCY MEDICINE
Payer: COMMERCIAL

## 2024-03-19 VITALS
BODY MASS INDEX: 21.52 KG/M2 | RESPIRATION RATE: 18 BRPM | SYSTOLIC BLOOD PRESSURE: 110 MMHG | OXYGEN SATURATION: 98 % | WEIGHT: 142 LBS | DIASTOLIC BLOOD PRESSURE: 74 MMHG | TEMPERATURE: 98 F | HEART RATE: 77 BPM | HEIGHT: 68 IN
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
## 2024-03-19 PROCEDURE — 63600175 PHARM REV CODE 636 W HCPCS: Performed by: PHYSICIAN ASSISTANT

## 2024-03-19 PROCEDURE — 99284 EMERGENCY DEPT VISIT MOD MDM: CPT | Mod: 25

## 2024-03-19 PROCEDURE — 96372 THER/PROPH/DIAG INJ SC/IM: CPT | Performed by: PHYSICIAN ASSISTANT

## 2024-03-19 RX ORDER — KETOROLAC TROMETHAMINE 30 MG/ML
30 INJECTION, SOLUTION INTRAMUSCULAR; INTRAVENOUS
Status: COMPLETED | OUTPATIENT
Start: 2024-03-19 | End: 2024-03-19

## 2024-03-19 RX ADMIN — KETOROLAC TROMETHAMINE 30 MG: 30 INJECTION, SOLUTION INTRAMUSCULAR at 12:03

## 2024-03-19 NOTE — DISCHARGE INSTRUCTIONS
Please take over-the-counter ibuprofen twice a day for the next 5-7 days.  You may take 2 or 3 tablets at a time.  Alternate heating pad and ice pack for 15 minutes at a time multiple times a day for additional pain relief.  If your symptoms worsen you may return to the ED for reevaluation. Otherwise, please follow up with your primary care doctor within 1 week.
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
## 2024-05-16 ENCOUNTER — HOSPITAL ENCOUNTER (EMERGENCY)
Facility: HOSPITAL | Age: 59
Discharge: HOME | End: 2024-05-16
Attending: STUDENT IN AN ORGANIZED HEALTH CARE EDUCATION/TRAINING PROGRAM
Payer: COMMERCIAL

## 2024-05-16 VITALS
BODY MASS INDEX: 20.92 KG/M2 | RESPIRATION RATE: 20 BRPM | HEIGHT: 68 IN | TEMPERATURE: 98 F | SYSTOLIC BLOOD PRESSURE: 119 MMHG | DIASTOLIC BLOOD PRESSURE: 81 MMHG | HEART RATE: 76 BPM | OXYGEN SATURATION: 97 % | WEIGHT: 138 LBS

## 2024-05-16 DIAGNOSIS — R10.13 EPIGASTRIC ABDOMINAL PAIN: Primary | ICD-10-CM

## 2024-05-16 DIAGNOSIS — R11.2 NAUSEA AND VOMITING: ICD-10-CM

## 2024-05-16 LAB
ALBUMIN SERPL BCP-MCNC: 4.1 G/DL (ref 3.5–5.2)
ALLENS TEST: ABNORMAL
ALP SERPL-CCNC: 56 U/L (ref 55–135)
ALT SERPL W/O P-5'-P-CCNC: 14 U/L (ref 10–44)
ANION GAP SERPL CALC-SCNC: 10 MMOL/L (ref 8–16)
ANION GAP SERPL CALC-SCNC: 9 MMOL/L (ref 8–16)
AST SERPL-CCNC: 22 U/L (ref 10–40)
BASOPHILS # BLD AUTO: 0.07 K/UL (ref 0–0.2)
BASOPHILS NFR BLD: 1 % (ref 0–1.9)
BILIRUB SERPL-MCNC: 0.8 MG/DL (ref 0.1–1)
BILIRUB UR QL STRIP: NEGATIVE
BUN SERPL-MCNC: 16 MG/DL (ref 6–20)
BUN SERPL-MCNC: 16 MG/DL (ref 6–30)
CALCIUM SERPL-MCNC: 9.8 MG/DL (ref 8.7–10.5)
CHLORIDE SERPL-SCNC: 102 MMOL/L (ref 95–110)
CHLORIDE SERPL-SCNC: 103 MMOL/L (ref 95–110)
CLARITY UR: CLEAR
CO2 SERPL-SCNC: 25 MMOL/L (ref 23–29)
COLOR UR: YELLOW
CREAT SERPL-MCNC: 0.9 MG/DL (ref 0.5–1.4)
CREAT SERPL-MCNC: 0.9 MG/DL (ref 0.5–1.4)
CTP QC/QA: YES
CTP QC/QA: YES
DIFFERENTIAL METHOD BLD: ABNORMAL
EOSINOPHIL # BLD AUTO: 0 K/UL (ref 0–0.5)
EOSINOPHIL NFR BLD: 0.1 % (ref 0–8)
ERYTHROCYTE [DISTWIDTH] IN BLOOD BY AUTOMATED COUNT: 12.8 % (ref 11.5–14.5)
EST. GFR  (NO RACE VARIABLE): >60 ML/MIN/1.73 M^2
ETHANOL SERPL-MCNC: <10 MG/DL
GLUCOSE SERPL-MCNC: 100 MG/DL (ref 70–110)
GLUCOSE SERPL-MCNC: 101 MG/DL (ref 70–110)
GLUCOSE UR QL STRIP: NEGATIVE
HCT VFR BLD AUTO: 45.7 % (ref 40–54)
HCT VFR BLD CALC: 48 %PCV (ref 36–54)
HGB BLD-MCNC: 15.2 G/DL (ref 14–18)
HGB UR QL STRIP: NEGATIVE
IMM GRANULOCYTES # BLD AUTO: 0.02 K/UL (ref 0–0.04)
IMM GRANULOCYTES NFR BLD AUTO: 0.3 % (ref 0–0.5)
KETONES UR QL STRIP: ABNORMAL
LEUKOCYTE ESTERASE UR QL STRIP: NEGATIVE
LIPASE SERPL-CCNC: 22 U/L (ref 4–60)
LYMPHOCYTES # BLD AUTO: 0.8 K/UL (ref 1–4.8)
LYMPHOCYTES NFR BLD: 11 % (ref 18–48)
MAGNESIUM SERPL-MCNC: 1.9 MG/DL (ref 1.6–2.6)
MCH RBC QN AUTO: 30.6 PG (ref 27–31)
MCHC RBC AUTO-ENTMCNC: 33.3 G/DL (ref 32–36)
MCV RBC AUTO: 92 FL (ref 82–98)
MONOCYTES # BLD AUTO: 0.5 K/UL (ref 0.3–1)
MONOCYTES NFR BLD: 7 % (ref 4–15)
NEUTROPHILS # BLD AUTO: 5.6 K/UL (ref 1.8–7.7)
NEUTROPHILS NFR BLD: 80.6 % (ref 38–73)
NITRITE UR QL STRIP: NEGATIVE
NRBC BLD-RTO: 0 /100 WBC
PH UR STRIP: 6 [PH] (ref 5–8)
PLATELET # BLD AUTO: 287 K/UL (ref 150–450)
PMV BLD AUTO: 9.1 FL (ref 9.2–12.9)
POC IONIZED CALCIUM: 1.23 MMOL/L (ref 1.06–1.42)
POC MOLECULAR INFLUENZA A AGN: NEGATIVE
POC MOLECULAR INFLUENZA B AGN: NEGATIVE
POC TCO2 (MEASURED): 29 MMOL/L (ref 23–29)
POTASSIUM BLD-SCNC: 3.9 MMOL/L (ref 3.5–5.1)
POTASSIUM SERPL-SCNC: 4.3 MMOL/L (ref 3.5–5.1)
PROT SERPL-MCNC: 7.2 G/DL (ref 6–8.4)
PROT UR QL STRIP: ABNORMAL
RBC # BLD AUTO: 4.96 M/UL (ref 4.6–6.2)
SAMPLE: ABNORMAL
SARS-COV-2 RDRP RESP QL NAA+PROBE: NEGATIVE
SITE: ABNORMAL
SODIUM BLD-SCNC: 135 MMOL/L (ref 136–145)
SODIUM SERPL-SCNC: 138 MMOL/L (ref 136–145)
SP GR UR STRIP: >1.03 (ref 1–1.03)
URN SPEC COLLECT METH UR: ABNORMAL
UROBILINOGEN UR STRIP-ACNC: NEGATIVE EU/DL
WBC # BLD AUTO: 6.89 K/UL (ref 3.9–12.7)

## 2024-05-16 PROCEDURE — 82565 ASSAY OF CREATININE: CPT

## 2024-05-16 PROCEDURE — 87502 INFLUENZA DNA AMP PROBE: CPT

## 2024-05-16 PROCEDURE — 83690 ASSAY OF LIPASE: CPT | Performed by: PHYSICIAN ASSISTANT

## 2024-05-16 PROCEDURE — 87635 SARS-COV-2 COVID-19 AMP PRB: CPT

## 2024-05-16 PROCEDURE — 99900035 HC TECH TIME PER 15 MIN (STAT)

## 2024-05-16 PROCEDURE — 99285 EMERGENCY DEPT VISIT HI MDM: CPT | Mod: 25

## 2024-05-16 PROCEDURE — 96375 TX/PRO/DX INJ NEW DRUG ADDON: CPT

## 2024-05-16 PROCEDURE — 63600175 PHARM REV CODE 636 W HCPCS

## 2024-05-16 PROCEDURE — 84295 ASSAY OF SERUM SODIUM: CPT

## 2024-05-16 PROCEDURE — 25000003 PHARM REV CODE 250

## 2024-05-16 PROCEDURE — 84132 ASSAY OF SERUM POTASSIUM: CPT

## 2024-05-16 PROCEDURE — 82962 GLUCOSE BLOOD TEST: CPT

## 2024-05-16 PROCEDURE — 96361 HYDRATE IV INFUSION ADD-ON: CPT

## 2024-05-16 PROCEDURE — 82565 ASSAY OF CREATININE: CPT | Mod: 91

## 2024-05-16 PROCEDURE — 81003 URINALYSIS AUTO W/O SCOPE: CPT | Performed by: PHYSICIAN ASSISTANT

## 2024-05-16 PROCEDURE — 93005 ELECTROCARDIOGRAM TRACING: CPT

## 2024-05-16 PROCEDURE — 80053 COMPREHEN METABOLIC PANEL: CPT | Performed by: PHYSICIAN ASSISTANT

## 2024-05-16 PROCEDURE — 85025 COMPLETE CBC W/AUTO DIFF WBC: CPT | Performed by: PHYSICIAN ASSISTANT

## 2024-05-16 PROCEDURE — 82330 ASSAY OF CALCIUM: CPT

## 2024-05-16 PROCEDURE — 82077 ASSAY SPEC XCP UR&BREATH IA: CPT

## 2024-05-16 PROCEDURE — 25500020 PHARM REV CODE 255

## 2024-05-16 PROCEDURE — 85014 HEMATOCRIT: CPT | Mod: 91

## 2024-05-16 PROCEDURE — 96374 THER/PROPH/DIAG INJ IV PUSH: CPT

## 2024-05-16 PROCEDURE — 93010 ELECTROCARDIOGRAM REPORT: CPT | Mod: ,,, | Performed by: INTERNAL MEDICINE

## 2024-05-16 PROCEDURE — 83735 ASSAY OF MAGNESIUM: CPT

## 2024-05-16 RX ORDER — FAMOTIDINE 20 MG/1
20 TABLET, FILM COATED ORAL 2 TIMES DAILY
Qty: 20 TABLET | Refills: 0 | Status: SHIPPED | OUTPATIENT
Start: 2024-05-16 | End: 2025-05-16

## 2024-05-16 RX ORDER — PROMETHAZINE HYDROCHLORIDE 25 MG/1
25 TABLET ORAL EVERY 6 HOURS PRN
Qty: 15 TABLET | Refills: 0 | Status: SHIPPED | OUTPATIENT
Start: 2024-05-16

## 2024-05-16 RX ORDER — ONDANSETRON HYDROCHLORIDE 2 MG/ML
4 INJECTION, SOLUTION INTRAVENOUS
Status: COMPLETED | OUTPATIENT
Start: 2024-05-16 | End: 2024-05-16

## 2024-05-16 RX ORDER — FAMOTIDINE 10 MG/ML
20 INJECTION INTRAVENOUS
Status: COMPLETED | OUTPATIENT
Start: 2024-05-16 | End: 2024-05-16

## 2024-05-16 RX ORDER — ONDANSETRON 4 MG/1
4 TABLET, ORALLY DISINTEGRATING ORAL EVERY 6 HOURS PRN
Qty: 20 TABLET | Refills: 0 | Status: SHIPPED | OUTPATIENT
Start: 2024-05-16

## 2024-05-16 RX ADMIN — IOHEXOL 75 ML: 350 INJECTION, SOLUTION INTRAVENOUS at 02:05

## 2024-05-16 RX ADMIN — ONDANSETRON 4 MG: 2 INJECTION INTRAMUSCULAR; INTRAVENOUS at 02:05

## 2024-05-16 RX ADMIN — SODIUM CHLORIDE 1000 ML: 9 INJECTION, SOLUTION INTRAVENOUS at 02:05

## 2024-05-16 RX ADMIN — FAMOTIDINE 20 MG: 10 INJECTION, SOLUTION INTRAVENOUS at 02:05

## 2024-05-16 NOTE — DISCHARGE INSTRUCTIONS

## 2024-05-16 NOTE — Clinical Note
"Jamin Ren" Crenshaw was seen and treated in our emergency department on 5/16/2024.  He may return to work on 05/18/2024.       If you have any questions or concerns, please don't hesitate to call.      Meghna Crawford PA-C"

## 2024-05-16 NOTE — ED PROVIDER NOTES
Encounter Date: 5/16/2024       History     Chief Complaint   Patient presents with    Vomiting    Weakness     Pt presents to ER with complaints of vomiting since 0100, weakness and a hx of Chrom's disease. Pt denies pain or any other issues at this time.      Pt is a 59-year-old male with PMH of Crohn's disease and pancreatitis who presents to the emergency department for nausea and vomiting since 1 am this morning  With associated epigastric abdominal pain. Pt denies hematemesis. Pt also reports headache and runny nose since last night. Pt denies diarrhea, constipation, cough, or congestion. Denies fever or chills. Pt reports his pain does not worsen with lying down or decrease with positional changes. 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  He denies any fever, chills, chest pain, shortness of breath, hematemesis, dysuria, hematuria.    His last bowel movement was yesterday, which she states is normal.  He is still passing flatulence.No attempted treatment reported.    He denies any sick contacts.No other symptoms reported.    The history is provided by the patient. No  was used.     Review of patient's allergies indicates:  No Known Allergies  Past Medical History:   Diagnosis Date    Crohn's disease     pt reported    Degenerative disk disease     Disc disease, degenerative, thoracic or thoracolumbar     DJD (degenerative joint disease)     Tobacco abuse      Past Surgical History:   Procedure Laterality Date    COLONOSCOPY N/A 1/6/2023    Procedure: COLONOSCOPY;  Surgeon: Erasmo Gant MD;  Location: [facility];  Service: Endoscopy;  Laterality: N/A;  Pt states any Dr, instr via email - PC    ESOPHAGOGASTRODUODENOSCOPY N/A 1/6/2023    Procedure: EGD (ESOPHAGOGASTRODUODENOSCOPY);  Surgeon: Erasmo Gant MD;  Location: [facility];  Service: Endoscopy;  Laterality: N/A;    KNEE SURGERY  2003     Family History   Problem Relation Name Age of Onset     No Known Problems Mother      Brain cancer Paternal Grandmother      Colon cancer Neg Hx      Esophageal cancer Neg Hx       Social History     Tobacco Use    Smoking status: Every Day     Current packs/day: 1.00     Types: Cigarettes    Smokeless tobacco: Never   Substance Use Topics    Alcohol use: Yes     Comment: occasionally    Drug use: No     Review of Systems   Constitutional:  Positive for appetite change and fatigue. Negative for activity change, chills, diaphoresis and fever.   HENT:  Positive for sore throat. Negative for congestion, mouth sores, rhinorrhea, sinus pressure and sinus pain.    Respiratory:  Negative for cough, chest tightness, shortness of breath, wheezing and stridor.    Cardiovascular:  Negative for chest pain and palpitations.   Gastrointestinal:  Positive for abdominal pain, nausea and vomiting. Negative for abdominal distention, blood in stool, constipation and diarrhea.   Endocrine: Negative for polydipsia, polyphagia and polyuria.   Genitourinary:  Negative for dysuria, flank pain, frequency, hematuria and urgency.   Musculoskeletal:  Negative for arthralgias and myalgias.   Skin:  Negative for color change, pallor, rash and wound.       Physical Exam     Initial Vitals [05/16/24 1250]   BP Pulse Resp Temp SpO2   119/81 76 20 98.2 °F (36.8 °C) 97 %      MAP       --         Physical Exam    Nursing note and vitals reviewed.  Constitutional: He appears well-developed and well-nourished. He is not diaphoretic.  Non-toxic appearance. No distress.   HENT:   Head: Normocephalic and atraumatic.   Right Ear: Hearing, tympanic membrane, external ear and ear canal normal. Tympanic membrane is not perforated, not erythematous and not bulging.   Left Ear: Hearing, tympanic membrane, external ear and ear canal normal. Tympanic membrane is not perforated, not erythematous and not bulging.   Nose: Nose normal.   Mouth/Throat: Uvula is midline and oropharynx is clear and moist. No oropharyngeal  exudate.   Eyes: Conjunctivae and EOM are normal. Pupils are equal, round, and reactive to light.   Neck: Neck supple.   Normal range of motion.   Full passive range of motion without pain.     Cardiovascular:  Normal rate, regular rhythm and normal heart sounds.     Exam reveals no gallop and no friction rub.       No murmur heard.  Pulses:       Radial pulses are 2+ on the right side and 2+ on the left side.   Pulmonary/Chest: Effort normal and breath sounds normal. No respiratory distress. He has no wheezes. He has no rhonchi. He has no rales. He exhibits no tenderness.   Abdominal: Abdomen is soft. Bowel sounds are normal. He exhibits no distension. There is abdominal tenderness in the epigastric area.   No right CVA tenderness.  No left CVA tenderness. There is no rebound, no guarding, no tenderness at McBurney's point and negative Melton's sign. negative Rovsing's sign  Musculoskeletal:         General: Normal range of motion.      Cervical back: Full passive range of motion without pain, normal range of motion and neck supple. No rigidity.     Neurological: He is alert and oriented to person, place, and time. No cranial nerve deficit.   Neuro intact.  Strength and sensation intact to bilateral upper and lower extremities.   Skin: Skin is warm and dry. Capillary refill takes less than 2 seconds. No rash noted. No erythema. No pallor.   Psychiatric: He has a normal mood and affect.         ED Course   Procedures  Labs Reviewed   CBC W/ AUTO DIFFERENTIAL - Abnormal; Notable for the following components:       Result Value    MPV 9.1 (*)     Lymph # 0.8 (*)     Gran % 80.6 (*)     Lymph % 11.0 (*)     All other components within normal limits   URINALYSIS, REFLEX TO URINE CULTURE - Abnormal; Notable for the following components:    Specific Gravity, UA >1.030 (*)     Protein, UA Trace (*)     Ketones, UA 2+ (*)     All other components within normal limits    Narrative:     Specimen Source->Urine   ISTAT  PROCEDURE - Abnormal; Notable for the following components:    POC Sodium 135 (*)     All other components within normal limits   COMPREHENSIVE METABOLIC PANEL   LIPASE   MAGNESIUM   ALCOHOL,MEDICAL (ETHANOL)   SARS-COV-2 RDRP GENE   POCT INFLUENZA A/B MOLECULAR   ISTAT CHEM8     EKG Readings: (Independently Interpreted)    EKG revealed normal sinus rhythm with a rate of 69.  No STEMI.  SC interval 162.  .    Right bundle-branch block.  EKG signed by Dr. Ocasio.       Imaging Results              CT Abdomen Pelvis With IV Contrast NO Oral Contrast (Final result)  Result time 05/16/24 14:46:25      Final result by Vickey Chen MD (05/16/24 14:46:25)                   Impression:      No acute abnormality.    Other findings as described.      Electronically signed by: Vickey Chen  Date:    05/16/2024  Time:    14:46               Narrative:    EXAMINATION:  CT ABDOMEN PELVIS WITH IV CONTRAST    CLINICAL HISTORY:  Epigastric pain;    TECHNIQUE:  Low dose axial images, sagittal and coronal reformations were obtained from the lung bases to the pubic symphysis following the IV administration of 75 mL of Omnipaque 350 .  Oral contrast was not given.    COMPARISON:  CT abdomen pelvis 11/11/2022; ultrasound abdomen 03/31/2019    FINDINGS:  LUNG BASES: Unremarkable.    HEPATOBILIARY: Unchanged subcentimeter hypodensity in the left hepatic lobe, likely a hemangioma based on previous ultrasound appearance.  No new liver lesion.  Normal gallbladder.  No bile duct dilatation.    SPLEEN: No splenomegaly.    PANCREAS: No focal masses or ductal dilatation.    ADRENALS: No adrenal nodules.    KIDNEYS/URETERS: No hydronephrosis, stones, or solid mass lesions.    BLADDER/PELVIC ORGANS: Unremarkable.    PERITONEUM / RETROPERITONEUM: No free air or fluid.    LYMPH NODES: No lymphadenopathy.    VESSELS: Unremarkable.    GI TRACT: Few colonic diverticuli.  No evidence of bowel obstruction or inflammation.  Normal  appendix.    BONES AND SOFT TISSUES: Osteopenia and degenerative changes.  No fractures or focal osseous lesions.                                       Medications   ondansetron injection 4 mg (4 mg Intravenous Given 5/16/24 1407)   sodium chloride 0.9% bolus 1,000 mL 1,000 mL (0 mLs Intravenous Stopped 5/16/24 1501)   famotidine (PF) injection 20 mg (20 mg Intravenous Given 5/16/24 1407)   iohexoL (OMNIPAQUE 350) injection 75 mL (75 mLs Intravenous Given 5/16/24 1428)     Medical Decision Making  This is a 59-year-old male with PMH of Crohn's disease and pancreatitis who presents to the emergency department for nausea and vomiting since 1 am this morning  With associated epigastric abdominal pain. Pt denies hematemesis. Pt also reports headache and runny nose since last night. Pt denies diarrhea, constipation, cough, or congestion. Denies fever or chills. Pt reports his pain does not worsen with lying down or decrease with positional changes. 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  He denies any fever, chills, chest pain, shortness of breath, hematemesis, dysuria, hematuria.    His last bowel movement was yesterday, which she states is normal.   On physical exam, patient is actively vomiting.   He appears uncomfortable.Nontoxic appearing.  Lungs are clear to auscultation bilaterally.  Abdomen is soft.  Mild tenderness noted to the epigastrium.  No guarding, rigidity, rebound.  2+ radial pulses bilaterally.  Posterior oropharynx is not erythematous.  No edema or exudate.  Uvula midline.  Bilateral tympanic membrane is normal.  No erythema, bulging, or perforations.  Neuro intact.  Strength and sensation intact bilateral upper and lower extremities.   No CVA tenderness bilaterally.  Pepcid, Zofran, fluids ordered.  Will reassess.  EKG revealed normal sinus rhythm with a rate of 69.  No STEMI.  Pr interval 162.  .  Right bundle branch block.  EKG signed by Dr. Ocasio.  [facility]  without evidence of any leukocytosis or anemia.  CMP unremarkable.  Doubt electrolyte abnormality.  Lipase unremarkable.  Doubt pancreatitis.  Magnesium unremarkable.  Ethanol unremarkable.  COVID and flu negative.  UA with 2+ ketones.  Negative leukocytes, negative nitrites.  Doubt cystitis, pyelonephritis, nephrolithiasis at this time.  CT revealed:  no acute abnormality.  Upon reassessment, patient reports relief to his symptoms.  He is able to tolerate p.o. challenge.  His symptoms have resolved.  Will discharge patient on Zofran, Phenergan, and Pepcid.  Advised patient to drink lot of fluids upon discharge.  Urged prompt follow-up with PCP for further evaluation.    Strict return precautions given. I discussed with the patient/family the diagnosis, treatment plan, indications for return to the emergency department, and for expected follow-up. The patient/family verbalized an understanding. The patient/family is asked if there are any questions or concerns. We discuss the case, until all issues are addressed to the patient/family's satisfaction. Patient/family understands and is agreeable to the plan. Patient is stable and ready for discharge.      Amount and/or Complexity of Data Reviewed  Labs: ordered.  Radiology: ordered.    Risk  Prescription drug management.                                      Clinical Impression:  Final diagnoses:  [R11.2] Nausea and vomiting  [R10.13] Epigastric abdominal pain (Primary)          ED Disposition Condition    Discharge Stable          ED Prescriptions       Medication Sig Dispense Start Date End Date Auth. Provider    famotidine (PEPCID) 20 MG tablet Take 1 tablet (20 mg total) by mouth 2 (two) times daily. 20 tablet 5/16/2024 5/16/2025 Meghna Crawford PA-C    ondansetron (ZOFRAN-ODT) 4 MG TbDL Take 1 tablet (4 mg total) by mouth every 6 (six) hours as needed (nausea). 20 tablet 5/16/2024 -- Meghna Crawford PA-C    promethazine (PHENERGAN) 25 MG tablet Take 1 tablet (25 mg  total) by mouth every 6 (six) hours as needed for Nausea. 15 tablet 5/16/2024 -- Meghna Crawford PA-C          Follow-up Information       Follow up With Specialties Details Why Contact Info    Betzaida Meyer MD Family Medicine In 2 days for further evaluation [address] [facility] [address]  [phone]      [facility] - Emergency Dept Emergency Medicine In 2 days If symptoms worsen [address] [facility] [place] [id number] [phone]             Meghna Crawford PA-C  05/16/24 [id number]

## 2024-05-17 LAB
OHS QRS DURATION: 130 MS
OHS QTC CALCULATION: 452 MS

## 2024-09-23 ENCOUNTER — NURSE TRIAGE (OUTPATIENT)
Dept: ADMINISTRATIVE | Facility: CLINIC | Age: 59
End: 2024-09-23
Payer: COMMERCIAL

## 2024-09-23 NOTE — TELEPHONE ENCOUNTER
Yesterday, pt began feeling bad while he was cutting grass. Bp when he got home was 169/107. Later that evening, it was 185/84 and 153/93. Today does not have feel as bad today, has been resting. Just feels weird. Does not take BP meds. Yesterday had chest pain but not today other than some tightness. Most recent BP during call 179/79, pulse 81. Also reported some occasional adb cramping.    Dispo- go to ED now. Pt advised and VU. Stressed importance to go in and be evaluated to rule out anything emergent and/or not delay treatment if applicalbe. He VU.  Reason for Disposition   Systolic BP >= 160 OR Diastolic >= 100, and any cardiac (e.g., breathing difficulty, chest pain) or neurologic symptoms (e.g., new-onset blurred or double vision)    Additional Information   Negative: Sounds like a life-threatening emergency to the triager    Protocols used: Blood Pressure - High-A-OH

## 2024-09-24 ENCOUNTER — HOSPITAL ENCOUNTER (EMERGENCY)
Facility: HOSPITAL | Age: 59
Discharge: HOME | End: 2024-09-24
Attending: EMERGENCY MEDICINE
Payer: COMMERCIAL

## 2024-09-24 VITALS
OXYGEN SATURATION: 99 % | TEMPERATURE: 98 F | DIASTOLIC BLOOD PRESSURE: 80 MMHG | HEART RATE: 62 BPM | WEIGHT: 135 LBS | RESPIRATION RATE: 17 BRPM | SYSTOLIC BLOOD PRESSURE: 135 MMHG | BODY MASS INDEX: 20.46 KG/M2 | HEIGHT: 68 IN

## 2024-09-24 DIAGNOSIS — R10.9 ABDOMINAL PAIN: ICD-10-CM
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
## 2024-09-24 DIAGNOSIS — R07.9 CHEST PAIN, UNSPECIFIED TYPE: Primary | ICD-10-CM

## 2024-09-24 LAB
ALBUMIN SERPL BCP-MCNC: 3.6 G/DL (ref 3.5–5.2)
ALLENS TEST: ABNORMAL
ALP SERPL-CCNC: 57 U/L (ref 55–135)
ALT SERPL W/O P-5'-P-CCNC: 14 U/L (ref 10–44)
ANION GAP SERPL CALC-SCNC: 7 MMOL/L (ref 8–16)
ANION GAP SERPL CALC-SCNC: 9 MMOL/L (ref 8–16)
AST SERPL-CCNC: 20 U/L (ref 10–40)
BACTERIA #/AREA URNS HPF: ABNORMAL /HPF
BASOPHILS # BLD AUTO: 0.08 K/UL (ref 0–0.2)
BASOPHILS NFR BLD: 1.1 % (ref 0–1.9)
BILIRUB SERPL-MCNC: 0.3 MG/DL (ref 0.1–1)
BILIRUB UR QL STRIP: NEGATIVE
BNP SERPL-MCNC: <10 PG/ML (ref 0–99)
BUN SERPL-MCNC: 11 MG/DL (ref 6–20)
BUN SERPL-MCNC: 11 MG/DL (ref 6–30)
CALCIUM SERPL-MCNC: 8.9 MG/DL (ref 8.7–10.5)
CHLORIDE SERPL-SCNC: 105 MMOL/L (ref 95–110)
CHLORIDE SERPL-SCNC: 105 MMOL/L (ref 95–110)
CK SERPL-CCNC: 93 U/L (ref 20–200)
CLARITY UR: CLEAR
CO2 SERPL-SCNC: 21 MMOL/L (ref 23–29)
COLOR UR: YELLOW
CREAT SERPL-MCNC: 0.9 MG/DL (ref 0.5–1.4)
CREAT SERPL-MCNC: 0.9 MG/DL (ref 0.5–1.4)
DIFFERENTIAL METHOD BLD: ABNORMAL
EOSINOPHIL # BLD AUTO: 0.1 K/UL (ref 0–0.5)
EOSINOPHIL NFR BLD: 1.6 % (ref 0–8)
ERYTHROCYTE [DISTWIDTH] IN BLOOD BY AUTOMATED COUNT: 12.4 % (ref 11.5–14.5)
EST. GFR  (NO RACE VARIABLE): >60 ML/MIN/1.73 M^2
GLUCOSE SERPL-MCNC: 93 MG/DL (ref 70–110)
GLUCOSE SERPL-MCNC: 94 MG/DL (ref 70–110)
GLUCOSE UR QL STRIP: NEGATIVE
HCT VFR BLD AUTO: 42.9 % (ref 40–54)
HCT VFR BLD CALC: 42 %PCV (ref 36–54)
HGB BLD-MCNC: 14.5 G/DL (ref 14–18)
HGB UR QL STRIP: ABNORMAL
IMM GRANULOCYTES # BLD AUTO: 0.02 K/UL (ref 0–0.04)
IMM GRANULOCYTES NFR BLD AUTO: 0.3 % (ref 0–0.5)
KETONES UR QL STRIP: ABNORMAL
LEUKOCYTE ESTERASE UR QL STRIP: NEGATIVE
LIPASE SERPL-CCNC: 27 U/L (ref 4–60)
LYMPHOCYTES # BLD AUTO: 0.8 K/UL (ref 1–4.8)
LYMPHOCYTES NFR BLD: 10.2 % (ref 18–48)
MAGNESIUM SERPL-MCNC: 1.9 MG/DL (ref 1.6–2.6)
MCH RBC QN AUTO: 31.8 PG (ref 27–31)
MCHC RBC AUTO-ENTMCNC: 33.8 G/DL (ref 32–36)
MCV RBC AUTO: 94 FL (ref 82–98)
MICROSCOPIC COMMENT: ABNORMAL
MONOCYTES # BLD AUTO: 1.1 K/UL (ref 0.3–1)
MONOCYTES NFR BLD: 14.3 % (ref 4–15)
NEUTROPHILS # BLD AUTO: 5.4 K/UL (ref 1.8–7.7)
NEUTROPHILS NFR BLD: 72.5 % (ref 38–73)
NITRITE UR QL STRIP: NEGATIVE
NRBC BLD-RTO: 0 /100 WBC
OHS QRS DURATION: 134 MS
OHS QTC CALCULATION: 428 MS
PH UR STRIP: 6 [PH] (ref 5–8)
PLATELET # BLD AUTO: 239 K/UL (ref 150–450)
PMV BLD AUTO: 9.1 FL (ref 9.2–12.9)
POC IONIZED CALCIUM: 1.15 MMOL/L (ref 1.06–1.42)
POC TCO2 (MEASURED): 24 MMOL/L (ref 23–29)
POCT GLUCOSE: 92 MG/DL (ref 70–110)
POTASSIUM BLD-SCNC: 4 MMOL/L (ref 3.5–5.1)
POTASSIUM SERPL-SCNC: 4 MMOL/L (ref 3.5–5.1)
PROT SERPL-MCNC: 6.6 G/DL (ref 6–8.4)
PROT UR QL STRIP: ABNORMAL
RBC # BLD AUTO: 4.56 M/UL (ref 4.6–6.2)
RBC #/AREA URNS HPF: 5 /HPF (ref 0–4)
SAMPLE: ABNORMAL
SITE: ABNORMAL
SODIUM BLD-SCNC: 131 MMOL/L (ref 136–145)
SODIUM SERPL-SCNC: 135 MMOL/L (ref 136–145)
SP GR UR STRIP: >1.03 (ref 1–1.03)
TROPONIN I SERPL DL<=0.01 NG/ML-MCNC: 0.01 NG/ML (ref 0–0.03)
TROPONIN I SERPL DL<=0.01 NG/ML-MCNC: <0.006 NG/ML (ref 0–0.03)
URN SPEC COLLECT METH UR: ABNORMAL
UROBILINOGEN UR STRIP-ACNC: NEGATIVE EU/DL
WBC # BLD AUTO: 7.47 K/UL (ref 3.9–12.7)

## 2024-09-24 PROCEDURE — 82962 GLUCOSE BLOOD TEST: CPT

## 2024-09-24 PROCEDURE — 99285 EMERGENCY DEPT VISIT HI MDM: CPT | Mod: 25

## 2024-09-24 PROCEDURE — 83690 ASSAY OF LIPASE: CPT | Performed by: EMERGENCY MEDICINE

## 2024-09-24 PROCEDURE — 82330 ASSAY OF CALCIUM: CPT

## 2024-09-24 PROCEDURE — 82565 ASSAY OF CREATININE: CPT

## 2024-09-24 PROCEDURE — 99900035 HC TECH TIME PER 15 MIN (STAT)

## 2024-09-24 PROCEDURE — 83880 ASSAY OF NATRIURETIC PEPTIDE: CPT | Performed by: EMERGENCY MEDICINE

## 2024-09-24 PROCEDURE — 96361 HYDRATE IV INFUSION ADD-ON: CPT

## 2024-09-24 PROCEDURE — 82550 ASSAY OF CK (CPK): CPT | Performed by: EMERGENCY MEDICINE

## 2024-09-24 PROCEDURE — 25000003 PHARM REV CODE 250

## 2024-09-24 PROCEDURE — 85025 COMPLETE CBC W/AUTO DIFF WBC: CPT | Performed by: EMERGENCY MEDICINE

## 2024-09-24 PROCEDURE — 84484 ASSAY OF TROPONIN QUANT: CPT | Mod: 91 | Performed by: EMERGENCY MEDICINE

## 2024-09-24 PROCEDURE — 93010 ELECTROCARDIOGRAM REPORT: CPT | Mod: ,,, | Performed by: INTERNAL MEDICINE

## 2024-09-24 PROCEDURE — 83605 ASSAY OF LACTIC ACID: CPT

## 2024-09-24 PROCEDURE — 96360 HYDRATION IV INFUSION INIT: CPT

## 2024-09-24 PROCEDURE — 93005 ELECTROCARDIOGRAM TRACING: CPT

## 2024-09-24 PROCEDURE — 84484 ASSAY OF TROPONIN QUANT: CPT

## 2024-09-24 PROCEDURE — 84295 ASSAY OF SERUM SODIUM: CPT

## 2024-09-24 PROCEDURE — 84132 ASSAY OF SERUM POTASSIUM: CPT

## 2024-09-24 PROCEDURE — 81000 URINALYSIS NONAUTO W/SCOPE: CPT | Performed by: EMERGENCY MEDICINE

## 2024-09-24 PROCEDURE — 83735 ASSAY OF MAGNESIUM: CPT | Performed by: EMERGENCY MEDICINE

## 2024-09-24 PROCEDURE — 80053 COMPREHEN METABOLIC PANEL: CPT | Performed by: EMERGENCY MEDICINE

## 2024-09-24 RX ORDER — METOCLOPRAMIDE 10 MG/1
10 TABLET ORAL EVERY 6 HOURS
Qty: 30 TABLET | Refills: 0 | Status: SHIPPED | OUTPATIENT
Start: 2024-09-24

## 2024-09-24 RX ORDER — SODIUM CHLORIDE 9 MG/ML
1000 INJECTION, SOLUTION INTRAVENOUS
Status: COMPLETED | OUTPATIENT
Start: 2024-09-24 | End: 2024-09-24

## 2024-09-24 RX ORDER — DICYCLOMINE HYDROCHLORIDE 20 MG/1
20 TABLET ORAL EVERY 6 HOURS
COMMUNITY

## 2024-09-24 RX ORDER — BUPRENORPHINE AND NALOXONE 8; 2 MG/1; MG/1
FILM, SOLUBLE BUCCAL; SUBLINGUAL 2 TIMES DAILY
COMMUNITY
Start: 2024-08-21

## 2024-09-24 RX ORDER — ACETAMINOPHEN 325 MG/1
650 TABLET ORAL
Status: COMPLETED | OUTPATIENT
Start: 2024-09-24 | End: 2024-09-24

## 2024-09-24 RX ADMIN — ACETAMINOPHEN 650 MG: 325 TABLET ORAL at 09:09

## 2024-09-24 RX ADMIN — SODIUM CHLORIDE 1000 ML: 9 INJECTION, SOLUTION INTRAVENOUS at 09:09

## 2024-09-24 NOTE — DISCHARGE INSTRUCTIONS
Thank you for coming to our Emergency Department today. It is important to remember that some problems or medical conditions are difficult to diagnose and may not be found or addressed during your Emergency Department visit.  These conditions often start with non-specific symptoms and can only be diagnosed on follow up visits with your primary care physician or specialist when the symptoms continue or change. Please remember that all medical conditions can change, and we cannot predict how you will be feeling tomorrow or the next day. Return to the ER with any questions/concerns, new/concerning symptoms, worsening or failure to improve.       Be sure to follow up with your primary care doctor and review all labs/imaging/tests that were performed during your ER visit with them. It is very common for us to identify non-emergent incidental findings which must be followed up with your primary care physician.  Some labs/imaging/tests may be outside of the normal range, and require non-emergent follow-up and/or further investigation/treatment/procedures/testing to help diagnose/exclude/prevent complications or other potentially serious medical conditions. Some abnormalities may not have been discussed or addressed during your ER visit.     An ER visit does not replace a primary care visit, and many screening tests or follow-up tests cannot be ordered by an ER doctor or performed by the ER. Some tests may even require pre-approval.    If you do not have a primary care doctor, you may contact the one listed on your discharge paperwork or you may also call the [facility] Appointment Desk at [phone] , or [address] [place] [address] at  [phone] to schedule an appointment, or establish care with a primary care doctor or even a specialist and to obtain information about local resources. It is important to your health that you have a primary care doctor.    Please take all medications as directed. We have done our best to select  a medication for you that will treat your condition however, all medications may potentially have side-effects and it is impossible to predict which medications may give you side-effects or what those side-effects (if any) those medications may give you.  If you feel that you are having a negative effect or side-effect of any medication you should stop taking those medications immediately and seek medical attention. If you feel that you are having a life-threatening reaction call 911.        Do not drive, swim, climb to height, take a bath, operate heavy machinery, drink alcohol or take potentially sedating medications, sign any legal documents or make any important decisions for 24 hours if you have received any pain medications, sedatives or mood altering drugs during your ER visit or within 24 hours of taking them if they have been prescribed to you.     You can find additional resources for Dentists, hearing aids, durable medical equipment, low cost pharmacies and other resources at [email or web address]

## 2024-09-24 NOTE — ED TRIAGE NOTES
"Pt reports abdominal cramping, chest pressure, lightheadedness and nausea x Sunday while out cutting the grass. Reports "just feeling weird." Hx chron's. Reports taking bentyl at 530 with some relief.   "

## 2024-09-24 NOTE — ED PROVIDER NOTES
"Encounter Date: 9/24/2024       History     Chief Complaint   Patient presents with    Abdominal Pain     Reports abdominal cramping, chest pressure, lightheadedness and nausea x Sunday while out cutting the grass. Reports "just feeling weird." Hx chron's. Reports taking bentyl at 530 with some relief.       Patient is a 59-year-old male past medical history Crohn's disease presenting for chest pain.  Patient states 3 days ago he was mowing the grass and started having chest tightness.  States it was 15/10 pain that came and went throughout the day.  Reports occasional shortness of breath and some nausea.  Reports he has had not had this problem before with exertional activities.  He notes he has been having occasional headaches since Sunday as well.  Reports he has been having abdominal cramping and diarrhea since yesterday.  States abdominal pain similar to when he has Chron's flares. Denies blood in his stool, dysuria, or hematuria. Does note occasional dizziness when he stands up from sitting down or bending over. Denies URI symptoms or         Review of patient's allergies indicates:  No Known Allergies  Past Medical History:   Diagnosis Date    Crohn's disease     pt reported    Degenerative disk disease     Disc disease, degenerative, thoracic or thoracolumbar     DJD (degenerative joint disease)     Tobacco abuse      Past Surgical History:   Procedure Laterality Date    COLONOSCOPY N/A 1/6/2023    Procedure: COLONOSCOPY;  Surgeon: Erasmo Gant MD;  Location: [facility];  Service: Endoscopy;  Laterality: N/A;  Pt states any Dr, instr via email - PC    ESOPHAGOGASTRODUODENOSCOPY N/A 1/6/2023    Procedure: EGD (ESOPHAGOGASTRODUODENOSCOPY);  Surgeon: Erasmo Gant MD;  Location: [facility];  Service: Endoscopy;  Laterality: N/A;    KNEE SURGERY  2003     Family History   Problem Relation Name Age of Onset    No Known Problems Mother      Brain cancer Paternal Grandmother      Colon cancer Neg Hx      " Esophageal cancer Neg Hx       Social History     Tobacco Use    Smoking status: Every Day     Current packs/day: 1.00     Types: Cigarettes    Smokeless tobacco: Never   Substance Use Topics    Alcohol use: Yes     Comment: occasionally    Drug use: No     Review of Systems   Constitutional:  Positive for chills. Negative for fever.   HENT:  Negative for congestion and rhinorrhea.    Eyes:  Negative for visual disturbance.   Respiratory:  Positive for shortness of breath. Negative for cough.    Cardiovascular:  Positive for chest pain. Negative for palpitations and leg swelling.   Gastrointestinal:  Positive for abdominal pain, diarrhea and nausea. Negative for blood in stool and vomiting.   Genitourinary:  Negative for dysuria.   Musculoskeletal:  Negative for arthralgias.   Skin:  Negative for color change.   Neurological:  Positive for dizziness and headaches. Negative for syncope, weakness and numbness.   Psychiatric/Behavioral:  Negative for confusion.        Physical Exam     Initial Vitals [09/24/24 0729]   BP Pulse Resp Temp SpO2   108/79 78 20 98 °F (36.7 °C) 100 %      MAP       --         Physical Exam    Constitutional: He appears well-developed and well-nourished.   HENT:   Head: Normocephalic and atraumatic.   Eyes: Conjunctivae and EOM are normal. Pupils are equal, round, and reactive to light.   Neck:   Normal range of motion.  Cardiovascular:  Normal rate and regular rhythm.           Pulmonary/Chest: Breath sounds normal. No respiratory distress. He has no wheezes. He has no rhonchi. He has no rales. He exhibits no tenderness.   Abdominal: Abdomen is soft. He exhibits no distension and no mass. There is abdominal tenderness. There is no rebound and no guarding.   Musculoskeletal:         General: No tenderness or edema. Normal range of motion.      Cervical back: Normal range of motion.     Neurological: He is alert and oriented to person, place, and time.   Skin: Skin is warm and dry. Capillary  refill takes less than 2 seconds. No erythema.   Psychiatric: He has a normal mood and affect.         ED Course   Procedures  Labs Reviewed   CBC W/ AUTO DIFFERENTIAL - Abnormal       Result Value    WBC 7.47      RBC 4.56 (*)     Hemoglobin 14.5      Hematocrit 42.9      MCV 94      MCH 31.8 (*)     MCHC 33.8      RDW 12.4      Platelets 239      MPV 9.1 (*)     Immature Granulocytes 0.3      Gran # (ANC) 5.4      Immature Grans (Abs) 0.02      Lymph # 0.8 (*)     Mono # 1.1 (*)     Eos # 0.1      Baso # 0.08      nRBC 0      Gran % 72.5      Lymph % 10.2 (*)     Mono % 14.3      Eosinophil % 1.6      Basophil % 1.1      Differential Method Automated     COMPREHENSIVE METABOLIC PANEL - Abnormal    Sodium 135 (*)     Potassium 4.0      Chloride 105      CO2 21 (*)     Glucose 93      BUN 11      Creatinine 0.9      Calcium 8.9      Total Protein 6.6      Albumin 3.6      Total Bilirubin 0.3      Alkaline Phosphatase 57      AST 20      ALT 14      eGFR >60      Anion Gap 9     ISTAT PROCEDURE - Abnormal    POC Glucose 94      POC BUN 11      POC Creatinine 0.9      POC Sodium 131 (*)     POC Potassium 4.0      POC Chloride 105      POC TCO2 (MEASURED) 24      POC Anion Gap 7 (*)     POC Ionized Calcium 1.15      POC Hematocrit 42      Sample VENOUS      Site Other      Allens Test N/A     LIPASE    Lipase 27     CK    CPK 93     MAGNESIUM    Magnesium 1.9     B-TYPE NATRIURETIC PEPTIDE    BNP <10     TROPONIN I    Troponin I 0.006     URINALYSIS, REFLEX TO URINE CULTURE   POCT GLUCOSE    POCT Glucose 92     ISTAT CHEM8   POCT GLUCOSE MONITORING CONTINUOUS          Imaging Results    None          Medications   acetaminophen tablet 650 mg (has no administration in time range)     Medical Decision Making  Patient is a 59-year-old male past medical history Crohn's disease presenting for chest pain.    Differential includes but not limited to ACS, arrhythmia, pericarditis however less likely due to lack of recent MI or  viral illness, pneumothorax, pneumonia, cardiac tamponade however less likely due to duration of symptoms and lack of a normal blood pressure or tachycardia.  Aortic dissection however less likely due to lack of persistent tearing chest pain, duration or symptoms, or markedly elevated or low blood pressure.  Abdominal pain differential includes but not limited to Crohn's disease, viral GI illness, pancreatitis less likely due to normal lipase, cholecystitis however presentation with diarrhea and location less likely-less concerning due to normal liver function.    Patient has generalized abdominal discomfort with palpation.  No rebound guarding.    EKG 57 beats per minute sinus bradycardia, RBBB noted similar to EKG on 05/16/2024. Troponin and BNP within normal limits. Repeat troponin wnl- less concerning for ACS. CBC unremarkable. Mild hyponatremia, otherwise CMP remarkable.  Urinalysis unremarkable. Chest x-ray negative for acute cardiopulmonary processes-that is less concerning for pneumonia or pneumothorax.    Tylenol given for pain. Patient given IV fluids.  Upon re-evaluation patient states he is feeling better.  No episodes of chest pain or shortness of breath.  His headache has resolved.  Patient notes he still has occasional abdominal cramping.  States it is similar to how it felt and prior episodes of Crohn's flares.  Notes that he took his dicyclomine this morning.  Patient is prescribed with Reglan for abdominal cramping.  Patient to be discharged and has been instructed to follow up with Cardiology for further evaluation of possible causes of chest pain.  Also recommended to follow up with PCP.  Return precautions advised if new or worsening symptoms.  Patient expresses understanding of return precautions and follow up plan.        Amount and/or Complexity of Data Reviewed  Labs: ordered.  Radiology: ordered.    Risk  OTC drugs.  Prescription drug management.                                       Clinical Impression:  Final diagnoses:  [R10.9] Abdominal pain                 Ivonne Schroeder PA  09/24/24 2013

## 2024-10-04 ENCOUNTER — HOSPITAL ENCOUNTER (INPATIENT)
Facility: HOSPITAL | Age: 59
LOS: 1 days | Discharge: HOME | DRG: 872 | End: 2024-10-05
Attending: EMERGENCY MEDICINE | Admitting: HOSPITALIST
Payer: COMMERCIAL

## 2024-10-04 DIAGNOSIS — J44.1 ACUTE EXACERBATION OF COPD WITH ASTHMA: Primary | ICD-10-CM

## 2024-10-04 DIAGNOSIS — R07.89 CHEST TIGHTNESS: ICD-10-CM

## 2024-10-04 DIAGNOSIS — A41.9 SEPSIS, DUE TO UNSPECIFIED ORGANISM, UNSPECIFIED WHETHER ACUTE ORGAN DYSFUNCTION PRESENT: ICD-10-CM

## 2024-10-04 DIAGNOSIS — R05.8 PRODUCTIVE COUGH: ICD-10-CM

## 2024-10-04 DIAGNOSIS — J20.9 ACUTE BRONCHITIS, UNSPECIFIED ORGANISM: ICD-10-CM

## 2024-10-04 DIAGNOSIS — R06.03 RESPIRATORY DISTRESS: ICD-10-CM

## 2024-10-04 LAB
ALBUMIN SERPL BCP-MCNC: 3.1 G/DL (ref 3.5–5.2)
ALP SERPL-CCNC: 80 U/L (ref 55–135)
ALT SERPL W/O P-5'-P-CCNC: 12 U/L (ref 10–44)
ANION GAP SERPL CALC-SCNC: 11 MMOL/L (ref 8–16)
AST SERPL-CCNC: 15 U/L (ref 10–40)
BASOPHILS # BLD AUTO: 0.08 K/UL (ref 0–0.2)
BASOPHILS NFR BLD: 0.5 % (ref 0–1.9)
BILIRUB SERPL-MCNC: 0.7 MG/DL (ref 0.1–1)
BILIRUB UR QL STRIP: NEGATIVE
BUN SERPL-MCNC: 8 MG/DL (ref 6–20)
CALCIUM SERPL-MCNC: 8.7 MG/DL (ref 8.7–10.5)
CHLORIDE SERPL-SCNC: 97 MMOL/L (ref 95–110)
CLARITY UR: CLEAR
CO2 SERPL-SCNC: 23 MMOL/L (ref 23–29)
COLOR UR: YELLOW
CREAT SERPL-MCNC: 0.9 MG/DL (ref 0.5–1.4)
CTP QC/QA: YES
CTP QC/QA: YES
DIFFERENTIAL METHOD BLD: ABNORMAL
EOSINOPHIL # BLD AUTO: 0.1 K/UL (ref 0–0.5)
EOSINOPHIL NFR BLD: 0.4 % (ref 0–8)
ERYTHROCYTE [DISTWIDTH] IN BLOOD BY AUTOMATED COUNT: 12 % (ref 11.5–14.5)
EST. GFR  (NO RACE VARIABLE): >60 ML/MIN/1.73 M^2
GLUCOSE SERPL-MCNC: 108 MG/DL (ref 70–110)
GLUCOSE UR QL STRIP: NEGATIVE
HCT VFR BLD AUTO: 39.6 % (ref 40–54)
HGB BLD-MCNC: 13.1 G/DL (ref 14–18)
HGB UR QL STRIP: NEGATIVE
IMM GRANULOCYTES # BLD AUTO: 0.07 K/UL (ref 0–0.04)
IMM GRANULOCYTES NFR BLD AUTO: 0.4 % (ref 0–0.5)
KETONES UR QL STRIP: NEGATIVE
LACTATE SERPL-SCNC: 1.7 MMOL/L (ref 0.5–2.2)
LEUKOCYTE ESTERASE UR QL STRIP: NEGATIVE
LYMPHOCYTES # BLD AUTO: 1.2 K/UL (ref 1–4.8)
LYMPHOCYTES NFR BLD: 7 % (ref 18–48)
MCH RBC QN AUTO: 30.5 PG (ref 27–31)
MCHC RBC AUTO-ENTMCNC: 33.1 G/DL (ref 32–36)
MCV RBC AUTO: 92 FL (ref 82–98)
MONOCYTES # BLD AUTO: 1.7 K/UL (ref 0.3–1)
MONOCYTES NFR BLD: 10 % (ref 4–15)
NEUTROPHILS # BLD AUTO: 13.5 K/UL (ref 1.8–7.7)
NEUTROPHILS NFR BLD: 81.7 % (ref 38–73)
NITRITE UR QL STRIP: NEGATIVE
NRBC BLD-RTO: 0 /100 WBC
PH UR STRIP: 7 [PH] (ref 5–8)
PLATELET # BLD AUTO: 355 K/UL (ref 150–450)
PMV BLD AUTO: 9.4 FL (ref 9.2–12.9)
POC MOLECULAR INFLUENZA A AGN: NEGATIVE
POC MOLECULAR INFLUENZA B AGN: NEGATIVE
POTASSIUM SERPL-SCNC: 4.5 MMOL/L (ref 3.5–5.1)
PROCALCITONIN SERPL IA-MCNC: 0.09 NG/ML
PROT SERPL-MCNC: 6.8 G/DL (ref 6–8.4)
PROT UR QL STRIP: NEGATIVE
RBC # BLD AUTO: 4.3 M/UL (ref 4.6–6.2)
SARS-COV-2 RDRP RESP QL NAA+PROBE: NEGATIVE
SODIUM SERPL-SCNC: 131 MMOL/L (ref 136–145)
SP GR UR STRIP: 1.01 (ref 1–1.03)
URN SPEC COLLECT METH UR: NORMAL
UROBILINOGEN UR STRIP-ACNC: NEGATIVE EU/DL
WBC # BLD AUTO: 16.53 K/UL (ref 3.9–12.7)

## 2024-10-04 PROCEDURE — 94640 AIRWAY INHALATION TREATMENT: CPT

## 2024-10-04 PROCEDURE — 87040 BLOOD CULTURE FOR BACTERIA: CPT | Mod: 59 | Performed by: EMERGENCY MEDICINE

## 2024-10-04 PROCEDURE — 63600175 PHARM REV CODE 636 W HCPCS: Performed by: EMERGENCY MEDICINE

## 2024-10-04 PROCEDURE — 96375 TX/PRO/DX INJ NEW DRUG ADDON: CPT

## 2024-10-04 PROCEDURE — 25000242 PHARM REV CODE 250 ALT 637 W/ HCPCS: Performed by: NURSE PRACTITIONER

## 2024-10-04 PROCEDURE — 25000003 PHARM REV CODE 250: Performed by: EMERGENCY MEDICINE

## 2024-10-04 PROCEDURE — 80053 COMPREHEN METABOLIC PANEL: CPT | Performed by: EMERGENCY MEDICINE

## 2024-10-04 PROCEDURE — 83605 ASSAY OF LACTIC ACID: CPT | Performed by: EMERGENCY MEDICINE

## 2024-10-04 PROCEDURE — 85025 COMPLETE CBC W/AUTO DIFF WBC: CPT | Performed by: EMERGENCY MEDICINE

## 2024-10-04 PROCEDURE — 25000242 PHARM REV CODE 250 ALT 637 W/ HCPCS: Performed by: EMERGENCY MEDICINE

## 2024-10-04 PROCEDURE — 81003 URINALYSIS AUTO W/O SCOPE: CPT | Performed by: EMERGENCY MEDICINE

## 2024-10-04 PROCEDURE — 93005 ELECTROCARDIOGRAM TRACING: CPT

## 2024-10-04 PROCEDURE — 99285 EMERGENCY DEPT VISIT HI MDM: CPT | Mod: 25

## 2024-10-04 PROCEDURE — 25000003 PHARM REV CODE 250: Performed by: NURSE PRACTITIONER

## 2024-10-04 PROCEDURE — 93010 ELECTROCARDIOGRAM REPORT: CPT | Mod: ,,, | Performed by: INTERNAL MEDICINE

## 2024-10-04 PROCEDURE — 11000001 HC ACUTE MED/SURG PRIVATE ROOM

## 2024-10-04 PROCEDURE — 96367 TX/PROPH/DG ADDL SEQ IV INF: CPT

## 2024-10-04 PROCEDURE — 84145 PROCALCITONIN (PCT): CPT | Performed by: EMERGENCY MEDICINE

## 2024-10-04 PROCEDURE — 21400001 HC TELEMETRY ROOM

## 2024-10-04 PROCEDURE — 87635 SARS-COV-2 COVID-19 AMP PRB: CPT

## 2024-10-04 PROCEDURE — 94760 N-INVAS EAR/PLS OXIMETRY 1: CPT

## 2024-10-04 PROCEDURE — 96365 THER/PROPH/DIAG IV INF INIT: CPT

## 2024-10-04 PROCEDURE — 87502 INFLUENZA DNA AMP PROBE: CPT

## 2024-10-04 PROCEDURE — 94761 N-INVAS EAR/PLS OXIMETRY MLT: CPT

## 2024-10-04 RX ORDER — SODIUM CHLORIDE 0.9 % (FLUSH) 0.9 %
10 SYRINGE (ML) INJECTION
Status: DISCONTINUED | OUTPATIENT
Start: 2024-10-04 | End: 2024-10-05 | Stop reason: HOSPADM

## 2024-10-04 RX ORDER — BUPRENORPHINE AND NALOXONE 2; .5 MG/1; MG/1
2 FILM, SOLUBLE BUCCAL; SUBLINGUAL DAILY
Status: DISCONTINUED | OUTPATIENT
Start: 2024-10-04 | End: 2024-10-05 | Stop reason: HOSPADM

## 2024-10-04 RX ORDER — SODIUM CHLORIDE 9 MG/ML
1000 INJECTION, SOLUTION INTRAVENOUS
Status: COMPLETED | OUTPATIENT
Start: 2024-10-04 | End: 2024-10-04

## 2024-10-04 RX ORDER — GUAIFENESIN 600 MG/1
600 TABLET, EXTENDED RELEASE ORAL 2 TIMES DAILY
Status: DISCONTINUED | OUTPATIENT
Start: 2024-10-04 | End: 2024-10-05 | Stop reason: HOSPADM

## 2024-10-04 RX ORDER — ACETAMINOPHEN 500 MG
1000 TABLET ORAL
Status: COMPLETED | OUTPATIENT
Start: 2024-10-04 | End: 2024-10-04

## 2024-10-04 RX ORDER — TALC
6 POWDER (GRAM) TOPICAL NIGHTLY PRN
Status: DISCONTINUED | OUTPATIENT
Start: 2024-10-04 | End: 2024-10-05 | Stop reason: HOSPADM

## 2024-10-04 RX ORDER — ALBUTEROL SULFATE 2.5 MG/.5ML
5 SOLUTION RESPIRATORY (INHALATION)
Status: COMPLETED | OUTPATIENT
Start: 2024-10-04 | End: 2024-10-04

## 2024-10-04 RX ORDER — BENZONATATE 100 MG/1
100 CAPSULE ORAL 3 TIMES DAILY PRN
Status: DISCONTINUED | OUTPATIENT
Start: 2024-10-04 | End: 2024-10-05 | Stop reason: HOSPADM

## 2024-10-04 RX ORDER — DEXAMETHASONE SODIUM PHOSPHATE 4 MG/ML
10 INJECTION, SOLUTION INTRA-ARTICULAR; INTRALESIONAL; INTRAMUSCULAR; INTRAVENOUS; SOFT TISSUE
Status: COMPLETED | OUTPATIENT
Start: 2024-10-04 | End: 2024-10-04

## 2024-10-04 RX ORDER — IPRATROPIUM BROMIDE AND ALBUTEROL SULFATE 2.5; .5 MG/3ML; MG/3ML
3 SOLUTION RESPIRATORY (INHALATION) EVERY 4 HOURS
Status: DISCONTINUED | OUTPATIENT
Start: 2024-10-04 | End: 2024-10-05 | Stop reason: HOSPADM

## 2024-10-04 RX ORDER — BUPRENORPHINE AND NALOXONE 2; .5 MG/1; MG/1
2 FILM, SOLUBLE BUCCAL; SUBLINGUAL 2 TIMES DAILY
Status: DISCONTINUED | OUTPATIENT
Start: 2024-10-04 | End: 2024-10-04

## 2024-10-04 RX ORDER — IPRATROPIUM BROMIDE AND ALBUTEROL SULFATE 2.5; .5 MG/3ML; MG/3ML
3 SOLUTION RESPIRATORY (INHALATION)
Status: COMPLETED | OUTPATIENT
Start: 2024-10-04 | End: 2024-10-04

## 2024-10-04 RX ORDER — ACETAMINOPHEN 325 MG/1
650 TABLET ORAL EVERY 4 HOURS PRN
Status: DISCONTINUED | OUTPATIENT
Start: 2024-10-04 | End: 2024-10-05 | Stop reason: HOSPADM

## 2024-10-04 RX ADMIN — IPRATROPIUM BROMIDE AND ALBUTEROL SULFATE 3 ML: 2.5; .5 SOLUTION RESPIRATORY (INHALATION) at 07:10

## 2024-10-04 RX ADMIN — IPRATROPIUM BROMIDE AND ALBUTEROL SULFATE 3 ML: 2.5; .5 SOLUTION RESPIRATORY (INHALATION) at 12:10

## 2024-10-04 RX ADMIN — SODIUM CHLORIDE 1000 ML: 9 INJECTION, SOLUTION INTRAVENOUS at 03:10

## 2024-10-04 RX ADMIN — IPRATROPIUM BROMIDE AND ALBUTEROL SULFATE 3 ML: 2.5; .5 SOLUTION RESPIRATORY (INHALATION) at 03:10

## 2024-10-04 RX ADMIN — ALBUTEROL SULFATE 5 MG: 2.5 SOLUTION RESPIRATORY (INHALATION) at 12:10

## 2024-10-04 RX ADMIN — SODIUM CHLORIDE 1000 ML: 9 INJECTION, SOLUTION INTRAVENOUS at 12:10

## 2024-10-04 RX ADMIN — ACETAMINOPHEN 1000 MG: 500 TABLET ORAL at 12:10

## 2024-10-04 RX ADMIN — AZITHROMYCIN MONOHYDRATE 500 MG: 500 INJECTION, POWDER, LYOPHILIZED, FOR SOLUTION INTRAVENOUS at 01:10

## 2024-10-04 RX ADMIN — GUAIFENESIN 600 MG: 600 TABLET, EXTENDED RELEASE ORAL at 10:10

## 2024-10-04 RX ADMIN — DEXAMETHASONE SODIUM PHOSPHATE 10 MG: 4 INJECTION INTRA-ARTICULAR; INTRALESIONAL; INTRAMUSCULAR; INTRAVENOUS; SOFT TISSUE at 12:10

## 2024-10-04 RX ADMIN — IPRATROPIUM BROMIDE AND ALBUTEROL SULFATE 3 ML: 2.5; .5 SOLUTION RESPIRATORY (INHALATION) at 10:10

## 2024-10-04 RX ADMIN — CEFTRIAXONE 2 G: 2 INJECTION, POWDER, FOR SOLUTION INTRAMUSCULAR; INTRAVENOUS at 12:10

## 2024-10-04 NOTE — ED PROVIDER NOTES
Encounter Date: 10/4/2024    SCRIBE #1 NOTE: I, Caterina Javed, am scribing for, and in the presence of,  Earnest Gastelum MD. I have scribed the following portions of the note - Other sections scribed: HPI, ROS.       History     Chief Complaint   Patient presents with    Cough     Productive cough x1 week/ took OTC meds.     Fever     Pt was seen at urgent care last week covid and flu negative     Jamin Singleton Jr. is a 59 y.o. male, with PMHx of Crohn's disease, DDD, DJD, and tobacco abuse who presents to the ED complaining of productive cough with yellow/grey sputum x 1 week. Patient reports associated fever (T-max 102.4F), rhinorrhea, generalized headache and chest tightness/pressure secondary to cough. He also reports having one posttussive emesis episode yesterday. Patient attempted treatment with OTC medication without relief. Patient admits to smoking cigarettes but has not smoked in 5 days. Patient denies shortness of breath, chest pain, chills, abdominal pain, nausea, diarrhea, dysuria, weakness, dizziness, congestion, sore throat, arm or leg trouble, eye pain, ear pain, rash, or other associated symptoms. Denies PMHx of PE or DVT. Denies hx of COPD in self but reports family hx of COPD.       The history is provided by the patient. No  was used.     Review of patient's allergies indicates:  No Known Allergies  Past Medical History:   Diagnosis Date    COPD (chronic obstructive pulmonary disease)     Crohn's disease     pt reported    Degenerative disk disease     Disc disease, degenerative, thoracic or thoracolumbar     DJD (degenerative joint disease)     Tobacco abuse      Past Surgical History:   Procedure Laterality Date    COLONOSCOPY N/A 1/6/2023    Procedure: COLONOSCOPY;  Surgeon: Erasmo Gant MD;  Location: [facility];  Service: Endoscopy;  Laterality: N/A;  Pt states any Dr, instr via email - PC    ESOPHAGOGASTRODUODENOSCOPY N/A 1/6/2023    Procedure: EGD  (ESOPHAGOGASTRODUODENOSCOPY);  Surgeon: Erasmo Gant MD;  Location: [facility];  Service: Endoscopy;  Laterality: N/A;    KNEE SURGERY  2003     Family History   Problem Relation Name Age of Onset    No Known Problems Mother      Brain cancer Paternal Grandmother      Colon cancer Neg Hx      Esophageal cancer Neg Hx       Social History     Tobacco Use    Smoking status: Former     Types: Cigarettes    Smokeless tobacco: Never   Substance Use Topics    Alcohol use: Yes     Comment: occasionally    Drug use: No     Review of Systems   Constitutional:  Positive for fever (T-max 102.4F). Negative for chills and diaphoresis.   HENT:  Positive for rhinorrhea. Negative for congestion, ear pain and sore throat.    Eyes:  Negative for pain.   Respiratory:  Positive for cough (productive yellow/grey sputum) and chest tightness (pressure). Negative for shortness of breath.    Cardiovascular:  Negative for chest pain.   Gastrointestinal:  Positive for vomiting. Negative for abdominal pain, diarrhea and nausea.   Genitourinary:  Negative for dysuria.   Musculoskeletal:  Negative for back pain.        (-) Arm or leg trouble.    Skin:  Negative for rash.   Neurological:  Positive for headaches. Negative for dizziness and weakness.   Psychiatric/Behavioral:  Negative for confusion.        Physical Exam     Initial Vitals [10/04/24 1125]   BP Pulse Resp Temp SpO2   125/83 86 18 (!) 100.9 °F (38.3 °C) 98 %      MAP       --         Physical Exam  The patient was examined specifically for the following:   General:No significant distress, Good color, Warm and dry. Head and neck:Scalp atraumatic, Neck supple. Neurological:Appropriate conversation, Gross motor deficits. Eyes:Conjugate gaze, Clear corneas. ENT: No epistaxis. Cardiac: Regular rate and rhythm, Grossly normal heart tones. Pulmonary: Wheezing, Rales. Gastrointestinal: Abdominal tenderness, Abdominal distention. Musculoskeletal: Extremity deformity, Apparent pain with  range of motion of the joints. Skin: Rash.   The findings on examination were normal except for the following:  The patient has temperature is 100.9°.  Lungs are remarkable for mild bilateral wheezing.  The patient has severe oxygen of cough during the physical examination.  The patient is warm to touch..  The heart tones are normal.  The abdomen is soft.  Extremities are nontender there is no apparent pain with range of motion any joints.  The patient  ED Course   Critical Care    Date/Time: 10/16/2024 1:32 PM    Performed by: Earnest Gastelum MD  Authorized by: Mirian Cuadra MD  Direct patient critical care time: 22 minutes  Additional history critical care time: 11 minutes  Ordering / reviewing critical care time: 11 minutes  Documentation critical care time: 11 minutes  Consulting other physicians critical care time: 11 minutes  Total critical care time (exclusive of procedural time) : 66 minutes  Critical care time was exclusive of separately billable procedures and treating other patients and teaching time.  Critical care was necessary to treat or prevent imminent or life-threatening deterioration of the following conditions: sepsis.  Critical care was time spent personally by me on the following activities: development of treatment plan with patient or surrogate, discussions with primary provider, evaluation of patient's response to treatment, examination of patient, obtaining history from patient or surrogate, ordering and performing treatments and interventions, ordering and review of laboratory studies, ordering and review of radiographic studies, pulse oximetry, re-evaluation of patient's condition and review of old charts.        Labs Reviewed   COMPREHENSIVE METABOLIC PANEL - Abnormal       Result Value    Sodium 131 (*)     Potassium 4.5      Chloride 97      CO2 23      Glucose 108      BUN 8      Creatinine 0.9      Calcium 8.7      Total Protein 6.8      Albumin 3.1 (*)     Total  Bilirubin 0.7      Alkaline Phosphatase 80      AST 15      ALT 12      eGFR >60      Anion Gap 11     CBC W/ AUTO DIFFERENTIAL - Abnormal    WBC 16.53 (*)     RBC 4.30 (*)     Hemoglobin 13.1 (*)     Hematocrit 39.6 (*)     MCV 92      MCH 30.5      MCHC 33.1      RDW 12.0      Platelets 355      MPV 9.4      Immature Granulocytes 0.4      Gran # (ANC) 13.5 (*)     Immature Grans (Abs) 0.07 (*)     Lymph # 1.2      Mono # 1.7 (*)     Eos # 0.1      Baso # 0.08      nRBC 0      Gran % 81.7 (*)     Lymph % 7.0 (*)     Mono % 10.0      Eosinophil % 0.4      Basophil % 0.5      Differential Method Automated     LACTIC ACID, PLASMA    Lactate (Lactic Acid) 1.7     URINALYSIS, REFLEX TO URINE CULTURE    Specimen UA Urine, Clean Catch      Color, UA Yellow      Appearance, UA Clear      pH, UA 7.0      Specific Gravity, UA 1.010      Protein, UA Negative      Glucose, UA Negative      Ketones, UA Negative      Bilirubin (UA) Negative      Occult Blood UA Negative      Nitrite, UA Negative      Urobilinogen, UA Negative      Leukocytes, UA Negative      Narrative:     Specimen Source->Urine   PROCALCITONIN    Procalcitonin 0.09     SARS-COV-2 RDRP GENE    POC Rapid COVID Negative       Acceptable Yes     POCT INFLUENZA A/B MOLECULAR    POC Molecular Influenza A Ag Negative      POC Molecular Influenza B Ag Negative       Acceptable Yes       EKG Readings: (Independently Interpreted)   This patient is in a normal sinus rhythm with a heart rate in 91.  The patient has a right bundle branch block.  There are no significant ST segment or T-wave changes.  There is no definite evidence of acute myocardial infarction or malignant arrhythmia.  This is doctor Gastelum dictating an I independently interpreted this EKG.     ECG Results              EKG 12-lead (Final result)        Collection Time Result Time QRS Duration OHS QTC Calculation    10/04/24 14:02:54 10/06/24 10:44:20 136 772                      Final result by Interface, Lab In [facility] (10/06/24 10:44:29)                   Narrative:    Test Reason : R07.89,    Vent. Rate : 091 BPM     Atrial Rate : 091 BPM     P-R Int : 150 ms          QRS Dur : 136 ms      QT Int : 384 ms       P-R-T Axes : 083 094 066 degrees     QTc Int : 472 ms    Normal sinus rhythm  Right bundle branch block  Abnormal ECG  When compared with ECG of 24-SEP-2024 07:39,  Significant changes have occurred  Confirmed by Rhett Pineda MD ([id number]) on 10/6/2024 10:44:18 AM    Referred By: [email or web address]   SELF           Confirmed By:Rhett Pineda MD                                     EKG 12-lead (Final result)  Result time 10/10/24 11:43:49      Final result by Unknown User (10/10/24 11:43:49)                                      Imaging Results              X-Ray Chest PA And Lateral (Final result)  Result time 10/04/24 11:48:44      Final result by Yeison Major MD (10/04/24 11:48:44)                   Impression:      The lungs appear hyperexpanded suggestive of underlying emphysematous changes.    No acute chest disease identified.      Electronically signed by: Yeison Major MD  Date:    10/04/2024  Time:    11:48               Narrative:    EXAMINATION:  XR CHEST PA AND LATERAL    CLINICAL HISTORY:  Other specified cough    TECHNIQUE:  PA and lateral views of the chest were performed.    COMPARISON:  09/24/2024.    FINDINGS:  The heart is not enlarged.  Superior mediastinal structures are unremarkable.  Pulmonary vasculature is within normal limits.  The lungs are free of focal consolidations.  The lungs do appear hyperexpanded suggestive of underlying emphysematous changes.  There is no evidence for pneumothorax or pleural effusions.  Bony structures appear intact.                                       Medications   sodium chloride 0.9% bolus 1,000 mL 1,000 mL (1,000 mLs Intravenous Not Given 10/4/24 1430)   sodium chloride 0.9% bolus 1,000 mL 1,000 mL (0 mLs Intravenous  Stopped 10/4/24 1337)   albuterol-ipratropium 2.5 mg-0.5 mg/3 mL nebulizer solution 3 mL (3 mLs Nebulization Given 10/4/24 1219)   albuterol sulfate nebulizer solution 5 mg (5 mg Nebulization Given 10/4/24 1219)   dexAMETHasone injection 10 mg (10 mg Intravenous Given 10/4/24 1247)   acetaminophen tablet 1,000 mg (1,000 mg Oral Given 10/4/24 1222)   cefTRIAXone (ROCEPHIN) 2 g in D5W 100 mL IVPB (MB+) (0 g Intravenous Stopped 10/4/24 1337)   azithromycin (ZITHROMAX) 500 mg in D5W 250 mL  IVPB (admixture device) (0 mg Intravenous Stopped 10/4/24 1503)   0.9%  NaCl infusion (1,000 mLs Intravenous New Bag 10/4/24 1508)     Medical Decision Making  Amount and/or Complexity of Data Reviewed  Labs: ordered. Decision-making details documented in ED Course.  Radiology: ordered. Decision-making details documented in ED Course.    Risk  OTC drugs.  Prescription drug management.    Given the above, this patient presents emergency room with fever a history of cigarette smoking bilateral wheezing respiratory distress.  Oxygen saturations have been as low as 90% in the emergency room.  The patient has had temperatures as high as 102.4 at home.  Had a fever in the emergency room.  His heart rate rate is 93 at 2:13 p.m. chest x-ray does not reveal infiltrates.  I believe this is bacterial bronchitis with a an exacerbation of COPD.  I reviewed the case with hospital medicine Doctor Sundar, who recommends observation in the hospital.  The patient was treated with Zithromax and Rocephin because of the sepsis protocol.  He has not yet received full sepsis fluids.  He has a normal lactate.  The procalcitonin was negative.  Blood count was elevated at [address].  I still have concern for bacterial infection though the infection may not be systemic.  Viral disease is also in the differential diagnosis.  The patient has some chest tightness but his EKG shows no evidence of ischemia.  The urine is clean.  Admit the patient to the hospital.       Focused sepsis reperfusion examination:  At 2:24 p.m., a sepsis reperfusion examination was performed.  At this time,  the patient's heart rate has come down to 92.  I will give him the remainder of his fluid bolus for sepsis.  The patient has been treated with antibiotics.  The personal lactate was 1.7.  A 2nd lactate will not be required.  The patient's blood pressure is stable.             Scribe Attestation:   Scribe #1: I performed the above scribed service and the documentation accurately describes the services I performed. I attest to the accuracy of the note.                             Please note that the documentation on this chart was provided by the scribe above on the date of service noted above, and that the documentation in the chart accurately reflects the work and decisions made by me alone.  Signed, Dr. Gastelum  Clinical Impression:  Final diagnoses:  [R05.8] Productive cough  [R07.89] Chest tightness  [J44.1] Acute exacerbation of COPD with asthma (Primary)  [R06.03] Respiratory distress  [A41.9] Sepsis, due to unspecified organism, unspecified whether acute organ dysfunction present          ED Disposition Condition    Admit Stable                Earnest Gastelum MD  10/04/24 1422       Earnest Gastelum MD  10/04/24 1425       Earnest Gastelum MD  10/16/24 1405

## 2024-10-04 NOTE — PHARMACY MED REC
"Admission Medication History     The home medication history was taken by Harriet Goddard CPhT.    You may go to "Admission" then "Reconcile Home Medications" tabs to review and/or act upon these items.     The home medication list has been updated by the Pharmacy department.   Please read ALL comments highlighted in yellow.   Please address this information as you see fit.    Feel free to contact us if you have any questions or require assistance.      The medications listed below were removed from the home medication list. Please reorder if appropriate:  Patient reports no longer taking the following medication(s):  Bentyl 20 ng tab  Zofran 4 mg tab  Anaspaz,Levsin 0.125 mg tab  Pepcid 20 mg tab  Roxicodone 15 mg tab  Nicoderm 21 mg/ 24 hr    Medications listed below were obtained from: Patient/family and Analytic software- [employer]  (Not in a hospital admission)    Patient stated he took Mucinex DM this morning about 1100 hrs and [employer] brand Nyquil last night.    Patient preferred pharmacy was called to verify buprenorphine-naloxone 8-2 mg film sL bid.        Harriet Goddard CP.  [phone]        .          "

## 2024-10-04 NOTE — ASSESSMENT & PLAN NOTE
Presents to the ED for productive cough of yellow-green sputum shortness a breath x1 week.  Patient had fever this a.m 102.4  Currently O2 sat 92-95% on room air  Met sepsis criteria, lactate procalcitonin normal range  Continue azithromycin Rocephin  Hold off further steroids at this time  DuoNeb every 4 hours , likely underlying to COPD  Tylenol p.r.n. for fever  Supplemental oxygen to keep O2 greater than 92% if needed

## 2024-10-04 NOTE — SUBJECTIVE & OBJECTIVE
Past Medical History:   Diagnosis Date    Crohn's disease     pt reported    Degenerative disk disease     Disc disease, degenerative, thoracic or thoracolumbar     DJD (degenerative joint disease)     Tobacco abuse        Past Surgical History:   Procedure Laterality Date    COLONOSCOPY N/A 1/6/2023    Procedure: COLONOSCOPY;  Surgeon: Erasmo Gant MD;  Location: [facility] ENDO;  Service: Endoscopy;  Laterality: N/A;  Pt states any Dr, instr via email - PC    ESOPHAGOGASTRODUODENOSCOPY N/A 1/6/2023    Procedure: EGD (ESOPHAGOGASTRODUODENOSCOPY);  Surgeon: Erasmo Gant MD;  Location: [facility] ENDO;  Service: Endoscopy;  Laterality: N/A;    KNEE SURGERY  2003       Review of patient's allergies indicates:  No Known Allergies    Current Facility-Administered Medications on File Prior to Encounter   Medication    triamcinolone acetonide injection 80 mg     Current Outpatient Medications on File Prior to Encounter   Medication Sig    buprenorphine-naloxone 8-2 mg (SUBOXONE) 8-2 mg 2 (two) times daily.    ondansetron (ZOFRAN-ODT) 4 MG TbDL Take 1 tablet (4 mg total) by mouth every 6 (six) hours as needed (nausea).    promethazine (PHENERGAN) 25 MG tablet Take 1 tablet (25 mg total) by mouth every 6 (six) hours as needed for Nausea.    metoclopramide HCl (REGLAN) 10 MG tablet Take 1 tablet (10 mg total) by mouth every 6 (six) hours. (Patient not taking: Reported on 10/4/2024)    [DISCONTINUED] dicyclomine (BENTYL) 20 mg tablet Take 20 mg by mouth every 6 (six) hours.    [DISCONTINUED] famotidine (PEPCID) 20 MG tablet Take 1 tablet (20 mg total) by mouth 2 (two) times daily.    [DISCONTINUED] hyoscyamine (ANASPAZ,LEVSIN) 0.125 mg Tab Take 1 tablet (125 mcg total) by mouth every 4 (four) hours as needed (cramping).    [DISCONTINUED] nicotine (NICODERM CQ) 21 mg/24 hr Place 1 patch onto the skin once daily. (Patient not taking: Reported on 11/16/2022)    [DISCONTINUED] ondansetron (ZOFRAN) 4 MG tablet Take 1 tablet (4  mg total) by mouth every 6 (six) hours as needed for Nausea.    [DISCONTINUED] oxyCODONE (ROXICODONE) 15 MG Tab Take 10 mg by mouth every 4 (four) hours as needed for Pain.     Family History       Problem Relation (Age of Onset)    Brain cancer Paternal Grandmother    No Known Problems Mother          Tobacco Use    Smoking status: Every Day     Current packs/day: 1.00     Types: Cigarettes    Smokeless tobacco: Never   Substance and Sexual Activity    Alcohol use: Yes     Comment: occasionally    Drug use: No    Sexual activity: Yes     Partners: Female     Review of Systems   Constitutional:  Positive for activity change, chills, fatigue and fever.   HENT:  Positive for congestion.    Eyes: Negative.    Respiratory:  Positive for cough, shortness of breath and wheezing.    Cardiovascular:  Positive for chest pain.   Gastrointestinal:  Positive for diarrhea and vomiting. Negative for abdominal pain and blood in stool.   Endocrine: Negative.    Genitourinary: Negative.    Musculoskeletal:  Positive for arthralgias and back pain.   Neurological: Negative.    Hematological: Negative.    Psychiatric/Behavioral: Negative.       Objective:     Vital Signs (Most Recent):  Temp: 99 °F (37.2 °C) (10/04/24 1342)  Pulse: 102 (10/04/24 1431)  Resp: 19 (10/04/24 1431)  BP: 117/73 (10/04/24 1431)  SpO2: 95 % (10/04/24 1430) Vital Signs (24h Range):  Temp:  [99 °F (37.2 °C)-100.9 °F (38.3 °C)] 99 °F (37.2 °C)  Pulse:  [] 102  Resp:  [18-19] 19  SpO2:  [92 %-99 %] 95 %  BP: (114-125)/(63-83) 117/73     Weight: 62.1 kg (137 lb)  Body mass index is 20.23 kg/m².     Physical Exam  Constitutional:       Appearance: Normal appearance. He is not ill-appearing.   HENT:      Head: Atraumatic.      Nose: Nose normal.   Cardiovascular:      Rate and Rhythm: Normal rate and regular rhythm.   Pulmonary:      Effort: Pulmonary effort is normal.      Breath sounds: Normal breath sounds. No wheezing or rhonchi.   Abdominal:       Palpations: Abdomen is soft.   Musculoskeletal:      Cervical back: Normal range of motion.      Right lower leg: No edema.      Left lower leg: No edema.   Skin:     General: Skin is dry.   Neurological:      General: No focal deficit present.      Mental Status: He is alert.                Significant Labs: All pertinent labs within the past 24 hours have been reviewed.    Significant Imaging: I have reviewed all pertinent imaging results/findings within the past 24 hours.

## 2024-10-04 NOTE — H&P
[facility] Medicine  History & Physical    Patient Name: Jamin Singleton Jr.  MRN: [id number]  Patient Class: IP- Inpatient  Admission Date: 10/4/2024  Attending Physician: Mirian Cuadra, *   Primary Care Provider: Betzaida Meyer MD         Patient information was obtained from patient and ER records.     Subjective:     Principal Problem:Acute bronchitis    Chief Complaint:   Chief Complaint   Patient presents with    Cough     Productive cough x1 week/ took OTC meds.     Fever     Pt was seen at urgent care last week covid and flu negative        HPI: Jamin Singleton is 58 yo male with significant history for chronic opioid now on Suboxone and 1 pack a day smoker times 16 years for shortness of breaths and productive cough yellow-green sputum x1 week.  Patient developed fever of 102.4 this a.m.  Patient took Mucinex and NyQuil with no relief.  Symptoms worse with activity and improves with rest No recent travel.  Lives with a roommate who is not sick.  He can hear himself wheezing now resolved after treatment in ED. No recent travel.     EKG normal sinus rhythm right bundle-branch block 91 .  CXR no consolidation  WBC 16 K with left shift.  Temp 100.4° on admission that subsided after Tylenol currently temp 99°.  O2 sat 92-95% on room air currently.  Lactic procalcitonin flu COVID-19 negative  UA no evidence of infection  Received dexamethasone 10 mg, Rocephin, azithromycin, DuoNeb, in ED      Past Medical History:   Diagnosis Date    Crohn's disease     pt reported    Degenerative disk disease     Disc disease, degenerative, thoracic or thoracolumbar     DJD (degenerative joint disease)     Tobacco abuse        Past Surgical History:   Procedure Laterality Date    COLONOSCOPY N/A 1/6/2023    Procedure: COLONOSCOPY;  Surgeon: Erasmo Gant MD;  Location: [facility];  Service: Endoscopy;  Laterality: N/A;  Pt states any Dr, instr via email - PC     ESOPHAGOGASTRODUODENOSCOPY N/A 1/6/2023    Procedure: EGD (ESOPHAGOGASTRODUODENOSCOPY);  Surgeon: Erasmo Gant MD;  Location: [facility];  Service: Endoscopy;  Laterality: N/A;    KNEE SURGERY  2003       Review of patient's allergies indicates:  No Known Allergies    Current Facility-Administered Medications on File Prior to Encounter   Medication    triamcinolone acetonide injection 80 mg     Current Outpatient Medications on File Prior to Encounter   Medication Sig    buprenorphine-naloxone 8-2 mg (SUBOXONE) 8-2 mg 2 (two) times daily.    ondansetron (ZOFRAN-ODT) 4 MG TbDL Take 1 tablet (4 mg total) by mouth every 6 (six) hours as needed (nausea).    promethazine (PHENERGAN) 25 MG tablet Take 1 tablet (25 mg total) by mouth every 6 (six) hours as needed for Nausea.    metoclopramide HCl (REGLAN) 10 MG tablet Take 1 tablet (10 mg total) by mouth every 6 (six) hours. (Patient not taking: Reported on 10/4/2024)    [DISCONTINUED] dicyclomine (BENTYL) 20 mg tablet Take 20 mg by mouth every 6 (six) hours.    [DISCONTINUED] famotidine (PEPCID) 20 MG tablet Take 1 tablet (20 mg total) by mouth 2 (two) times daily.    [DISCONTINUED] hyoscyamine (ANASPAZ,LEVSIN) 0.125 mg Tab Take 1 tablet (125 mcg total) by mouth every 4 (four) hours as needed (cramping).    [DISCONTINUED] nicotine (NICODERM CQ) 21 mg/24 hr Place 1 patch onto the skin once daily. (Patient not taking: Reported on 11/16/2022)    [DISCONTINUED] ondansetron (ZOFRAN) 4 MG tablet Take 1 tablet (4 mg total) by mouth every 6 (six) hours as needed for Nausea.    [DISCONTINUED] oxyCODONE (ROXICODONE) 15 MG Tab Take 10 mg by mouth every 4 (four) hours as needed for Pain.     Family History       Problem Relation (Age of Onset)    Brain cancer Paternal Grandmother    No Known Problems Mother          Tobacco Use    Smoking status: Every Day     Current packs/day: 1.00     Types: Cigarettes    Smokeless tobacco: Never   Substance and Sexual Activity    Alcohol  use: Yes     Comment: occasionally    Drug use: No    Sexual activity: Yes     Partners: Female     Review of Systems   Constitutional:  Positive for activity change, chills, fatigue and fever.   HENT:  Positive for congestion.    Eyes: Negative.    Respiratory:  Positive for cough, shortness of breath and wheezing.    Cardiovascular:  Positive for chest pain.   Gastrointestinal:  Positive for diarrhea and vomiting. Negative for abdominal pain and blood in stool.   Endocrine: Negative.    Genitourinary: Negative.    Musculoskeletal:  Positive for arthralgias and back pain.   Neurological: Negative.    Hematological: Negative.    Psychiatric/Behavioral: Negative.       Objective:     Vital Signs (Most Recent):  Temp: 99 °F (37.2 °C) (10/04/24 1342)  Pulse: 102 (10/04/24 1431)  Resp: 19 (10/04/24 1431)  BP: 117/73 (10/04/24 1431)  SpO2: 95 % (10/04/24 1430) Vital Signs (24h Range):  Temp:  [99 °F (37.2 °C)-100.9 °F (38.3 °C)] 99 °F (37.2 °C)  Pulse:  [] 102  Resp:  [18-19] 19  SpO2:  [92 %-99 %] 95 %  BP: (114-125)/(63-83) 117/73     Weight: 62.1 kg (137 lb)  Body mass index is 20.23 kg/m².     Physical Exam  Constitutional:       Appearance: Normal appearance. He is not ill-appearing.   HENT:      Head: Atraumatic.      Nose: Nose normal.   Cardiovascular:      Rate and Rhythm: Normal rate and regular rhythm.   Pulmonary:      Effort: Pulmonary effort is normal.      Breath sounds: Normal breath sounds. No wheezing or rhonchi.   Abdominal:      Palpations: Abdomen is soft.   Musculoskeletal:      Cervical back: Normal range of motion.      Right lower leg: No edema.      Left lower leg: No edema.   Skin:     General: Skin is dry.   Neurological:      General: No focal deficit present.      Mental Status: He is alert.                Significant Labs: All pertinent labs within the past 24 hours have been reviewed.    Significant Imaging: I have reviewed all pertinent imaging results/findings within the past 24  hours.  Assessment/Plan:     * Sepsis due to Acute bronchitis  Presents to the ED for productive cough of yellow-green sputum shortness a breath x1 week.  Patient had fever this a.m 102.4  Currently O2 sat 92-95% on room air  Met sepsis criteria, lactate procalcitonin normal range  Continue azithromycin Rocephin  Hold off further steroids at this time  DuoNeb every 4 hours , likely underlying to COPD  Tylenol p.r.n. for fever  Supplemental oxygen to keep O2 greater than 92% if needed      Tobacco abuse  No cravings right now.  Offered nicotine but we will hold off.  Encourage smoking cessation.      Chronic bilateral low back pain without sciatica  Patient was chronic fat no oxycodone for 8 years recently now on Suboxone 8-2 mg 1/2 film bid        VTE Risk Mitigation (From admission, onward)           Ordered     Place BLOSSOM hose  Until discontinued         10/04/24 1524                              Louann Diaz NP  Department of Hospital Medicine  [facility] - [place] - Emergency Dept

## 2024-10-04 NOTE — HPI
Jamin Singleton is 60 yo male with significant history for chronic opioid now on Suboxone and 1 pack a day smoker times 16 years for shortness of breaths and productive cough yellow-green sputum x1 week.  Patient developed fever of 102.4 this a.m.  Patient took Mucinex and NyQuil with no relief.  Symptoms worse with activity and improves with rest No recent travel.  Lives with a roommate who is not sick.  He can hear himself wheezing now resolved after treatment in ED. No recent travel.     EKG normal sinus rhythm right bundle-branch block 91 .  CXR no consolidation  WBC 16 K with left shift.  Temp 100.4° on admission that subsided after Tylenol currently temp 99°.  O2 sat 92-95% on room air currently.  Lactic procalcitonin flu COVID-19 negative  UA no evidence of infection  Received dexamethasone 10 mg, Rocephin, azithromycin, DuoNeb, in ED

## 2024-10-05 VITALS
SYSTOLIC BLOOD PRESSURE: 120 MMHG | TEMPERATURE: 98 F | DIASTOLIC BLOOD PRESSURE: 70 MMHG | RESPIRATION RATE: 16 BRPM | OXYGEN SATURATION: 96 % | HEART RATE: 71 BPM | WEIGHT: 138.69 LBS | HEIGHT: 69 IN | BODY MASS INDEX: 20.54 KG/M2

## 2024-10-05 LAB
ALBUMIN SERPL BCP-MCNC: 3 G/DL (ref 3.5–5.2)
ALP SERPL-CCNC: 77 U/L (ref 55–135)
ALT SERPL W/O P-5'-P-CCNC: 14 U/L (ref 10–44)
ANION GAP SERPL CALC-SCNC: 11 MMOL/L (ref 8–16)
AST SERPL-CCNC: 14 U/L (ref 10–40)
BASOPHILS # BLD AUTO: 0.02 K/UL (ref 0–0.2)
BASOPHILS NFR BLD: 0.1 % (ref 0–1.9)
BILIRUB SERPL-MCNC: 0.4 MG/DL (ref 0.1–1)
BUN SERPL-MCNC: 13 MG/DL (ref 6–20)
CALCIUM SERPL-MCNC: 9.1 MG/DL (ref 8.7–10.5)
CHLORIDE SERPL-SCNC: 105 MMOL/L (ref 95–110)
CO2 SERPL-SCNC: 20 MMOL/L (ref 23–29)
CREAT SERPL-MCNC: 0.8 MG/DL (ref 0.5–1.4)
DIFFERENTIAL METHOD BLD: ABNORMAL
EOSINOPHIL # BLD AUTO: 0 K/UL (ref 0–0.5)
EOSINOPHIL NFR BLD: 0 % (ref 0–8)
ERYTHROCYTE [DISTWIDTH] IN BLOOD BY AUTOMATED COUNT: 12.1 % (ref 11.5–14.5)
EST. GFR  (NO RACE VARIABLE): >60 ML/MIN/1.73 M^2
GLUCOSE SERPL-MCNC: 126 MG/DL (ref 70–110)
HCT VFR BLD AUTO: 37.3 % (ref 40–54)
HGB BLD-MCNC: 12.5 G/DL (ref 14–18)
IMM GRANULOCYTES # BLD AUTO: 0.09 K/UL (ref 0–0.04)
IMM GRANULOCYTES NFR BLD AUTO: 0.5 % (ref 0–0.5)
LYMPHOCYTES # BLD AUTO: 0.6 K/UL (ref 1–4.8)
LYMPHOCYTES NFR BLD: 3.5 % (ref 18–48)
MAGNESIUM SERPL-MCNC: 2 MG/DL (ref 1.6–2.6)
MCH RBC QN AUTO: 31.3 PG (ref 27–31)
MCHC RBC AUTO-ENTMCNC: 33.5 G/DL (ref 32–36)
MCV RBC AUTO: 94 FL (ref 82–98)
MONOCYTES # BLD AUTO: 1.3 K/UL (ref 0.3–1)
MONOCYTES NFR BLD: 8 % (ref 4–15)
NEUTROPHILS # BLD AUTO: 14.5 K/UL (ref 1.8–7.7)
NEUTROPHILS NFR BLD: 87.9 % (ref 38–73)
NRBC BLD-RTO: 0 /100 WBC
PHOSPHATE SERPL-MCNC: 2.9 MG/DL (ref 2.7–4.5)
PLATELET # BLD AUTO: 392 K/UL (ref 150–450)
PMV BLD AUTO: 8.8 FL (ref 9.2–12.9)
POTASSIUM SERPL-SCNC: 3.8 MMOL/L (ref 3.5–5.1)
PROT SERPL-MCNC: 6.7 G/DL (ref 6–8.4)
RBC # BLD AUTO: 3.99 M/UL (ref 4.6–6.2)
SODIUM SERPL-SCNC: 136 MMOL/L (ref 136–145)
WBC # BLD AUTO: 16.45 K/UL (ref 3.9–12.7)

## 2024-10-05 PROCEDURE — 80053 COMPREHEN METABOLIC PANEL: CPT | Performed by: NURSE PRACTITIONER

## 2024-10-05 PROCEDURE — 36415 COLL VENOUS BLD VENIPUNCTURE: CPT | Performed by: NURSE PRACTITIONER

## 2024-10-05 PROCEDURE — 83735 ASSAY OF MAGNESIUM: CPT | Performed by: NURSE PRACTITIONER

## 2024-10-05 PROCEDURE — 85025 COMPLETE CBC W/AUTO DIFF WBC: CPT | Performed by: NURSE PRACTITIONER

## 2024-10-05 PROCEDURE — 94640 AIRWAY INHALATION TREATMENT: CPT

## 2024-10-05 PROCEDURE — 63600175 PHARM REV CODE 636 W HCPCS: Performed by: NURSE PRACTITIONER

## 2024-10-05 PROCEDURE — 25000003 PHARM REV CODE 250: Performed by: NURSE PRACTITIONER

## 2024-10-05 PROCEDURE — 25000242 PHARM REV CODE 250 ALT 637 W/ HCPCS: Performed by: NURSE PRACTITIONER

## 2024-10-05 PROCEDURE — 94761 N-INVAS EAR/PLS OXIMETRY MLT: CPT

## 2024-10-05 PROCEDURE — 84100 ASSAY OF PHOSPHORUS: CPT | Performed by: NURSE PRACTITIONER

## 2024-10-05 RX ORDER — BENZONATATE 100 MG/1
100 CAPSULE ORAL 3 TIMES DAILY PRN
Qty: 30 CAPSULE | Refills: 0 | Status: SHIPPED | OUTPATIENT
Start: 2024-10-05 | End: 2024-10-09 | Stop reason: DRUGHIGH

## 2024-10-05 RX ORDER — AZITHROMYCIN 250 MG/1
250 TABLET, FILM COATED ORAL DAILY
Qty: 3 TABLET | Refills: 0 | Status: SHIPPED | OUTPATIENT
Start: 2024-10-06 | End: 2024-10-09

## 2024-10-05 RX ORDER — BUPRENORPHINE AND NALOXONE 2; .5 MG/1; MG/1
1 FILM, SOLUBLE BUCCAL; SUBLINGUAL DAILY
Start: 2024-10-06 | End: 2024-11-05

## 2024-10-05 RX ORDER — ALBUTEROL SULFATE 90 UG/1
2 INHALANT RESPIRATORY (INHALATION) EVERY 6 HOURS PRN
Qty: 18 G | Refills: 0 | Status: SHIPPED | OUTPATIENT
Start: 2024-10-05 | End: 2025-10-05

## 2024-10-05 RX ORDER — IPRATROPIUM BROMIDE AND ALBUTEROL SULFATE 2.5; .5 MG/3ML; MG/3ML
3 SOLUTION RESPIRATORY (INHALATION) EVERY 6 HOURS PRN
Qty: 75 ML | Refills: 0 | Status: SHIPPED | OUTPATIENT
Start: 2024-10-05 | End: 2025-10-05

## 2024-10-05 RX ORDER — GUAIFENESIN 600 MG/1
600 TABLET, EXTENDED RELEASE ORAL 2 TIMES DAILY
Qty: 20 TABLET | Refills: 0 | Status: SHIPPED | OUTPATIENT
Start: 2024-10-05 | End: 2024-10-15

## 2024-10-05 RX ADMIN — BUPRENORPHINE AND NALOXONE 2 FILM: 2; .5 FILM BUCCAL; SUBLINGUAL at 08:10

## 2024-10-05 RX ADMIN — BENZONATATE 100 MG: 100 CAPSULE ORAL at 02:10

## 2024-10-05 RX ADMIN — CEFTRIAXONE 2 G: 2 INJECTION, POWDER, FOR SOLUTION INTRAMUSCULAR; INTRAVENOUS at 08:10

## 2024-10-05 RX ADMIN — IPRATROPIUM BROMIDE AND ALBUTEROL SULFATE 3 ML: 2.5; .5 SOLUTION RESPIRATORY (INHALATION) at 07:10

## 2024-10-05 RX ADMIN — GUAIFENESIN 600 MG: 600 TABLET, EXTENDED RELEASE ORAL at 08:10

## 2024-10-05 RX ADMIN — IPRATROPIUM BROMIDE AND ALBUTEROL SULFATE 3 ML: 2.5; .5 SOLUTION RESPIRATORY (INHALATION) at 02:10

## 2024-10-05 RX ADMIN — AZITHROMYCIN MONOHYDRATE 250 MG: 500 INJECTION, POWDER, LYOPHILIZED, FOR SOLUTION INTRAVENOUS at 09:10

## 2024-10-05 NOTE — NURSING
[facility] [place]  Nurses Note -- 4 Eyes      10/5/2024       Skin assessed on: Q Shift      [x] No Pressure Injuries Present    []Prevention Measures Documented    [] Yes LDA  for Pressure Injury Previously documented     [] Yes New Pressure Injury Discovered   [] LDA for New Pressure Injury Added      Attending RN:  Jyothi Mckeon, RN     Second RN:  Brittany Acosta

## 2024-10-05 NOTE — HOSPITAL COURSE
Admission for sepsis due to acute bronchitis. Likely have underlying COPD. Rocephin/Azithromycin started in ED. Fever resolved. Remains on room air throughout observation stay.   Leukocytosis stable likely due to steroids. Continue azithromycin additional 3 days. Nebulizer/Inhaler rx. Encourage smoking cessation. Patient HDS for discharge home. Ambulatory refer to Pulmonary.

## 2024-10-05 NOTE — PLAN OF CARE
Case Management Assessment     PCP: Dr. Betzaida Meyer  Pharmacy: [facility]   Patient Arrived From: Home  Existing Help at Home: n/a  Barriers to Discharge: n/a     Discharge Plan:  A: Home  B: Home     Discharge planning assessment completed with patient at bedside. Patient resides at home alone and is independent at home. Patient will discharge home and follow up with his PCP.        10/05/24 1128   Discharge Assessment   Assessment Type Discharge Planning Assessment   Confirmed/corrected address, phone number and insurance Yes   Confirmed Demographics Correct on Facesheet   Source of Information patient   People in Home alone   Do you expect to return to your current living situation? Yes   Do you have help at home or someone to help you manage your care at home? No   Prior to hospitilization cognitive status: Unable to Assess   Current cognitive status: Alert/Oriented   Equipment Currently Used at Home none   Patient currently being followed by outpatient case management? No   Do you currently have service(s) that help you manage your care at home? No   Do you take prescription medications? Yes   Do you have prescription coverage? Yes   Coverage [facility] SHIELD - [employer] ALL OUT OF STATE -   Do you have any problems affording any of your prescribed medications? No   Is the patient taking medications as prescribed? yes   How do you get to doctors appointments? car, drives self   Are you on dialysis? No   Do you take coumadin? No   Discharge Plan A Home   Discharge Plan B Home   DME Needed Upon Discharge  nebulizer

## 2024-10-05 NOTE — DISCHARGE SUMMARY
[facility] Medicine  Discharge Summary      Patient Name: Jamin Singleton Jr.  MRN: [id number]  RANDY: [id number]  Patient Class: IP- Inpatient  Admission Date: 10/4/2024  Hospital Length of Stay: 1 days  Discharge Date and Time:  10/05/2024 9:37 AM  Attending Physician: Mirian Cuadra, *   Discharging Provider: Louann Edwards NP  Primary Care Provider: Betzaida Meyer MD    Primary Care Team: LOUANN EDWARDS    HPI:   Jamin Singleton is 60 yo male with significant history for chronic opioid now on Suboxone and 1 pack a day smoker times 16 years for shortness of breaths and productive cough yellow-green sputum x1 week.  Patient developed fever of 102.4 this a.m.  Patient took Mucinex and NyQuil with no relief.  Symptoms worse with activity and improves with rest No recent travel.  Lives with a roommate who is not sick.  He can hear himself wheezing now resolved after treatment in ED. No recent travel.     EKG normal sinus rhythm right bundle-branch block 91 .  CXR no consolidation  WBC 16 K with left shift.  Temp 100.4° on admission that subsided after Tylenol currently temp 99°.  O2 sat 92-95% on room air currently.  Lactic procalcitonin flu COVID-19 negative  UA no evidence of infection  Received dexamethasone 10 mg, Rocephin, azithromycin, DuoNeb, in ED      * No surgery found *      Hospital Course:   Admission for sepsis due to acute bronchitis. Likely have underlying COPD. Rocephin/Azithromycin started in ED. Fever resolved. Remains on room air throughout observation stay.   Leukocytosis stable likely due to steroids. Continue azithromycin additional 3 days. Nebulizer/Inhaler rx. Encourage smoking cessation. Patient HDS for discharge home. Ambulatory refer to Pulmonary.      Goals of Care Treatment Preferences:  Code Status: Full Code      SDOH Screening:  The patient was screened for utility difficulties, food insecurity, transport difficulties, housing insecurity, and  "interpersonal safety and there were no concerns identified this admission.     Consults:     No new Assessment & Plan notes have been filed under this hospital service since the last note was generated.  Service: Hospital Medicine    Final Active Diagnoses:    Diagnosis Date Noted POA    PRINCIPAL PROBLEM:  Sepsis due to Acute bronchitis [J20.9] 10/04/2024 Yes    Tobacco abuse [Z72.0] 11/11/2022 Yes    Chronic bilateral low back pain without sciatica [M54.50, G89.29] 02/04/2021 Yes      Problems Resolved During this Admission:       Discharged Condition: stable    Disposition: Home or Self Care    Follow Up:   Follow-up Information       Betzaida Meyer MD Follow up.    Specialty: Family Medicine  Contact information:  [address]  [address]  [place] [address] [phone]                           Patient Instructions:      NEBULIZER FOR HOME USE     Order Specific Question Answer Comments   Height: 5' 9" (1.753 m)    Weight: 62.9 kg (138 lb 10.7 oz)    Length of need (1-99 months): 99      Ambulatory referral/consult to Pulmonology   Standing Status: Future   Referral Priority: Routine Referral Type: Consultation   Referral Reason: Specialty Services Required   Requested Specialty: Pulmonary Disease   Number of Visits Requested: 1     Diet Adult Regular     Notify your health care provider if you experience any of the following:  temperature >100.4     Notify your health care provider if you experience any of the following:  difficulty breathing or increased cough     Notify your health care provider if you experience any of the following:  increased confusion or weakness     Activity as tolerated       Significant Diagnostic Studies: N/A    Pending Diagnostic Studies:       None           Medications:  Reconciled Home Medications:      Medication List        START taking these medications      albuterol 90 mcg/actuation inhaler  Commonly known as: VENTOLIN HFA  Inhale 2 puffs into the lungs every 6 (six) hours " as needed for Wheezing. Rescue     albuterol-ipratropium 2.5 mg-0.5 mg/3 mL nebulizer solution  Commonly known as: DUO-NEB  Take 3 mLs by nebulization every 6 (six) hours as needed for Wheezing. Rescue     azithromycin 250 MG tablet  Commonly known as: Z-RAMIREZ  Take 1 tablet (250 mg total) by mouth once daily. Take 2 tablets by mouth on day 1; Take 1 tablet by mouth on days 2-5 for 3 days  Start taking on: October 6, 2024     benzonatate 100 MG capsule  Commonly known as: TESSALON  Take 1 capsule (100 mg total) by mouth 3 (three) times daily as needed for Cough.     buprenorphine-naloxone 2-0.5 mg 2-0.5 mg Film  Commonly known as: SUBOXONE  Place 1 Film under the tongue once daily.  Start taking on: October 6, 2024  Replaces: buprenorphine-naloxone 8-2 mg 8-2 mg     guaiFENesin 600 mg 12 hr tablet  Commonly known as: MUCINEX  Take 1 tablet (600 mg total) by mouth 2 (two) times daily. for 10 days            CONTINUE taking these medications      promethazine 25 MG tablet  Commonly known as: PHENERGAN  Take 1 tablet (25 mg total) by mouth every 6 (six) hours as needed for Nausea.            STOP taking these medications      buprenorphine-naloxone 8-2 mg 8-2 mg  Commonly known as: SUBOXONE  Replaced by: buprenorphine-naloxone 2-0.5 mg 2-0.5 mg Film     metoclopramide HCl 10 MG tablet  Commonly known as: REGLAN     ondansetron 4 MG Tbdl  Commonly known as: ZOFRAN-ODT              Indwelling Lines/Drains at time of discharge:   Lines/Drains/Airways       None                   Time spent on the discharge of patient: 35 minutes         Louann Diaz NP  Department of Hospital Medicine  [facility] Surg

## 2024-10-05 NOTE — PLAN OF CARE
No acute distress noted, patient free from falls or injury this shift.  Bed in low position, wheels locked, call light in reach for assistance, plan of care continued.      Problem: Adult Inpatient Plan of Care  Goal: Optimal Comfort and Wellbeing  Outcome: Progressing  Goal: Readiness for Transition of Care  Outcome: Progressing

## 2024-10-05 NOTE — PLAN OF CARE
Home Nebulizer approved by Maryse with [facility].    1130- Nebulizer delivered to patient's room. Patient signed form.

## 2024-10-05 NOTE — NURSING
Patient arrived to floor via stretcher transported from ED. Patient transferred to bed independently.  AAOx4. Patient was oriented to room, information on whiteboard, and medication regimen. Bed low, adequate lighting provided, side rails x2 up, call bell within reach. Admission assessment completed.   VSS. Patient denied having any acute distress at this time. None observed. Will continue to monitor and follow treatment plan.     [facility]  Nurses Note -- 4 Eyes      10/4/2024       Skin assessed on: Admit      [x] No Pressure Injuries Present    []Prevention Measures Documented    [] Yes LDA  for Pressure Injury Previously documented     [] Yes New Pressure Injury Discovered   [] LDA for New Pressure Injury Added      Attending RN:  Brittany Acosta LPN     Second RN:  PANTERA Muñoz

## 2024-10-05 NOTE — ED NOTES
Pt report received from MAGALIE Wagonre. Pt updated on plan of care and admission. NS infusing at 125ml/hr. 97% on RA. Pt AAOx4.

## 2024-10-05 NOTE — PLAN OF CARE
Case Management Final Discharge Note    *Discharge Disposition: Home     *New DME ordered/ Company name: Nebulizer, [facility]    *Relevant SDOH/ Transition of Care Barriers: None    *Primary Caretaker and contact information: n/a    *Scheduled Followup Appointment: Refer to AVS    *Referrals Placed: None    *Transportation: Private Vehicle       TN educated patient on discharge plan and services. Bedside nurse Jyothi informed. Patient clear to discharge from Case Management standpoint.        10/05/24 1132   Final Note   Assessment Type Final Discharge Note   Anticipated Discharge Disposition Home   Hospital Resources/Appts/Education Provided Appointments scheduled and added to AVS

## 2024-10-06 LAB
OHS QRS DURATION: 136 MS
OHS QTC CALCULATION: 472 MS

## 2024-10-07 ENCOUNTER — TELEPHONE (OUTPATIENT)
Dept: PULMONOLOGY | Facility: CLINIC | Age: 59
End: 2024-10-07
Payer: COMMERCIAL

## 2024-10-07 DIAGNOSIS — J44.9 CHRONIC OBSTRUCTIVE PULMONARY DISEASE, UNSPECIFIED COPD TYPE: Primary | ICD-10-CM

## 2024-10-07 PROBLEM — R10.84 GENERALIZED ABDOMINAL PAIN: Status: ACTIVE | Noted: 2024-10-07

## 2024-10-08 LAB
BACTERIA BLD CULT: NORMAL
BACTERIA BLD CULT: NORMAL

## 2024-10-09 ENCOUNTER — OFFICE VISIT (OUTPATIENT)
Dept: FAMILY MEDICINE | Facility: CLINIC | Age: 59
End: 2024-10-09
Payer: COMMERCIAL

## 2024-10-09 VITALS
DIASTOLIC BLOOD PRESSURE: 66 MMHG | HEART RATE: 67 BPM | TEMPERATURE: 99 F | WEIGHT: 136.88 LBS | SYSTOLIC BLOOD PRESSURE: 112 MMHG | HEIGHT: 68 IN | OXYGEN SATURATION: 97 % | BODY MASS INDEX: 20.75 KG/M2

## 2024-10-09 DIAGNOSIS — Z13.31 POSITIVE DEPRESSION SCREENING: ICD-10-CM

## 2024-10-09 DIAGNOSIS — Z09 HOSPITAL DISCHARGE FOLLOW-UP: Primary | ICD-10-CM

## 2024-10-09 DIAGNOSIS — Z72.0 TOBACCO ABUSE: ICD-10-CM

## 2024-10-09 DIAGNOSIS — J30.1 SEASONAL ALLERGIC RHINITIS DUE TO POLLEN: ICD-10-CM

## 2024-10-09 DIAGNOSIS — R00.2 PALPITATIONS: ICD-10-CM

## 2024-10-09 DIAGNOSIS — R06.09 DYSPNEA ON EXERTION: ICD-10-CM

## 2024-10-09 DIAGNOSIS — R94.31 ABNORMAL EKG: ICD-10-CM

## 2024-10-09 DIAGNOSIS — J20.9 ACUTE BRONCHITIS, UNSPECIFIED ORGANISM: ICD-10-CM

## 2024-10-09 PROCEDURE — 99999 PR PBB SHADOW E&M-EST. PATIENT-LVL IV: CPT | Mod: PBBFAC,,, | Performed by: NURSE PRACTITIONER

## 2024-10-09 RX ORDER — FLUTICASONE PROPIONATE AND SALMETEROL 250; 50 UG/1; UG/1
1 POWDER RESPIRATORY (INHALATION) 2 TIMES DAILY
Qty: 60 EACH | Refills: 0 | Status: CANCELLED | OUTPATIENT
Start: 2024-10-09 | End: 2025-10-09

## 2024-10-09 RX ORDER — MONTELUKAST SODIUM 10 MG/1
10 TABLET ORAL NIGHTLY
Qty: 30 TABLET | Refills: 0 | Status: SHIPPED | OUTPATIENT
Start: 2024-10-09 | End: 2024-11-08

## 2024-10-09 RX ORDER — FLUTICASONE PROPIONATE AND SALMETEROL 100; 50 UG/1; UG/1
1 POWDER RESPIRATORY (INHALATION) 2 TIMES DAILY
Qty: 60 EACH | Refills: 0 | Status: SHIPPED | OUTPATIENT
Start: 2024-10-09 | End: 2025-10-09

## 2024-10-09 RX ORDER — BENZONATATE 200 MG/1
200 CAPSULE ORAL 3 TIMES DAILY PRN
Qty: 30 CAPSULE | Refills: 0 | Status: SHIPPED | OUTPATIENT
Start: 2024-10-09 | End: 2024-10-19

## 2024-10-09 RX ORDER — METHYLPREDNISOLONE ACETATE 40 MG/ML
40 INJECTION, SUSPENSION INTRA-ARTICULAR; INTRALESIONAL; INTRAMUSCULAR; SOFT TISSUE
Status: COMPLETED | OUTPATIENT
Start: 2024-10-09 | End: 2024-10-09

## 2024-10-09 RX ORDER — METHYLPREDNISOLONE 4 MG/1
TABLET ORAL
Qty: 21 EACH | Refills: 0 | Status: SHIPPED | OUTPATIENT
Start: 2024-10-09 | End: 2024-10-30

## 2024-10-09 RX ADMIN — METHYLPREDNISOLONE ACETATE 40 MG: 40 INJECTION, SUSPENSION INTRA-ARTICULAR; INTRALESIONAL; INTRAMUSCULAR; SOFT TISSUE at 08:10

## 2024-10-09 NOTE — PROGRESS NOTES
"Subjective:       Patient ID: Jamin Singleton Jr. is a 59 y.o. male.    Chief Complaint: Follow-up     Transitional Care Note    Family and/or Caretaker present at visit?  No.  Diagnostic tests reviewed/disposition: No diagnosic tests pending after this hospitalization.  Disease/illness education: Yes.  Home health/community services discussion/referrals: Patient does not have home health established from hospital visit.  They do not need home health.  If needed, we will set up home health for the patient.   Establishment or re-establishment of referral orders for community resources: No other necessary community resources.   Discussion with other health care providers: No discussion with other health care providers necessary.     Patient needs to be seen sooner with Pulmonology, scheduled 12/13.     HPI     Jamin Singleton Jr. is a 59 y.o. male patient that presents to clinic for hospital discharge follow up. Past medical and surgical history reviewed as listed. PCP is Betzaida Meyer MD , he is  new  to me. Patient with recent inpatient hospitalization from 10/4-10/5/2024 with a primary diagnosis of sepsis due to Acute bronchitis. Hospital discharge summary reviewed at length, as listed below. Patient has completed azithromycin course. He is now using Tessalon Perles p.r.n. for cough.  Patient reports he uses albuterol approximately 10 times per day.  States he has coughing fits sometimes induced by dust.  Coughing fit induces shortness of breath/dyspnea.    "Hospital Course:   Admission for sepsis due to acute bronchitis. Likely have underlying COPD. Rocephin/Azithromycin started in ED. Fever resolved. Remains on room air throughout observation stay.   Leukocytosis stable likely due to steroids. Continue azithromycin additional 3 days. Nebulizer/Inhaler rx. Encourage smoking cessation. Patient HDS for discharge home. Ambulatory refer to Pulmonary."    Cough  This is a new problem. The current episode " "started 1 to 4 weeks ago. The problem has been gradually improving. The problem occurs every few minutes. The cough is Productive of sputum. Associated symptoms include shortness of breath and wheezing. The symptoms are aggravated by dust and fumes. He has tried a beta-agonist inhaler, prescription cough suppressant, rest and ipratropium inhaler for the symptoms. The treatment provided moderate relief. His past medical history is significant for asthma and COPD (undiagnosed).     Patient needs [facility] paperwork completed.  States he is  at local [employer]. 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     ROS as listed.   Past Medical History:   Diagnosis Date    COPD (chronic obstructive pulmonary disease)     Crohn's disease     pt reported    Degenerative disk disease     Disc disease, degenerative, thoracic or thoracolumbar     DJD (degenerative joint disease)     Tobacco abuse       Past Surgical History:   Procedure Laterality Date    COLONOSCOPY N/A 1/6/2023    Procedure: COLONOSCOPY;  Surgeon: Erasmo Gant MD;  Location: [facility];  Service: Endoscopy;  Laterality: N/A;  Pt states any Dr, instr via email - PC    ESOPHAGOGASTRODUODENOSCOPY N/A 1/6/2023    Procedure: EGD (ESOPHAGOGASTRODUODENOSCOPY);  Surgeon: Erasmo Gant MD;  Location: [facility] ENDO;  Service: Endoscopy;  Laterality: N/A;    KNEE SURGERY  2003      Family History   Problem Relation Name Age of Onset    No Known Problems Mother      Brain cancer Paternal Grandmother      Colon cancer Neg Hx      Esophageal cancer Neg Hx        Review of patient's allergies indicates:  No Known Allergies  Review of Systems   Respiratory:  Positive for cough, shortness of breath and wheezing.        Objective:      Vitals:    10/09/24 0803   BP: 112/66   Patient Position: Sitting   Pulse: 67   Temp: 98.7 °F (37.1 °C)   SpO2: 97%   Weight: 62.1 kg (136 lb 14.5 oz)   Height: 5' 8" (1.727 m)      Physical Exam  Vitals " and nursing note reviewed.   Constitutional:       General: He is not in acute distress.     Appearance: Normal appearance.   HENT:      Head: Normocephalic and atraumatic.      Right Ear: Tympanic membrane normal.      Left Ear: Tympanic membrane normal.      Mouth/Throat:      Mouth: Mucous membranes are moist.      Pharynx: Posterior oropharyngeal erythema present.   Eyes:      Conjunctiva/sclera: Conjunctivae normal.      Pupils: Pupils are equal, round, and reactive to light.   Cardiovascular:      Rate and Rhythm: Normal rate and regular rhythm.      Heart sounds: Normal heart sounds. No murmur heard.  Pulmonary:      Effort: Pulmonary effort is normal. No respiratory distress.      Breath sounds: Normal breath sounds.      Comments: Strong productive cough with inspiration  Musculoskeletal:      Cervical back: Normal range of motion and neck supple.   Lymphadenopathy:      Cervical: No cervical adenopathy.   Skin:     General: Skin is warm and dry.   Neurological:      Mental Status: He is alert and oriented to person, place, and time.   Psychiatric:         Mood and Affect: Mood normal.         Behavior: Behavior normal.         Lab Results   Component Value Date    WBC 16.45 (H) 10/05/2024    HGB 12.5 (L) 10/05/2024    HCT 37.3 (L) 10/05/2024     10/05/2024    CHOL 184 11/11/2022    TRIG 51 11/11/2022    HDL 53 11/11/2022    ALT 14 10/05/2024    AST 14 10/05/2024     10/05/2024    K 3.8 10/05/2024     10/05/2024    CREATININE 0.8 10/05/2024    BUN 13 10/05/2024    CO2 20 (L) 10/05/2024    PSA 0.68 02/04/2021    HGBA1C 5.5 11/11/2022      Assessment:       1. Hospital discharge follow-up    2. Tobacco abuse    3. Acute bronchitis, unspecified organism    4. Palpitations    5. Dyspnea on exertion    6. Positive depression screening    7. Abnormal EKG    8. Seasonal allergic rhinitis due to pollen        Plan:       Hospital discharge follow-up  Stable.     Tobacco abuse  He has quit less  than 10 days ago.   Encouraged cessation.     Acute bronchitis, unspecified organism  -     benzonatate (TESSALON) 200 MG capsule; Take 1 capsule (200 mg total) by mouth 3 (three) times daily as needed for Cough.  Dispense: 30 capsule; Refill: 0  -     methylPREDNISolone (MEDROL DOSEPACK) 4 mg tablet; use as directed  Dispense: 21 each; Refill: 0  -     methylPREDNISolone acetate injection 40 mg    Palpitations  Avoid overuse of albuterol.   Follow up with cardiology for further evaluation.     Dyspnea on exertion  -     fluticasone-salmeterol diskus inhaler 100-50 mcg; Inhale 1 puff into the lungs 2 (two) times daily. Controller  Dispense: 60 each; Refill: 0    Positive depression screening  Provided emotional support.   Will reevaluate mood at f/u visit.   Comments:  I have reviewed the positive depression score with the patient and found that no additional intervention is needed at this time.    Abnormal EKG  Stable.   Follow up with Cardiology.     Seasonal allergic rhinitis due to pollen  -     montelukast (SINGULAIR) 10 mg tablet; Take 1 tablet (10 mg total) by mouth every evening.  Dispense: 30 tablet; Refill: 0      Medication List with Changes/Refills   New Medications    BENZONATATE (TESSALON) 200 MG CAPSULE    Take 1 capsule (200 mg total) by mouth 3 (three) times daily as needed for Cough.    FLUTICASONE-SALMETEROL DISKUS INHALER 100-50 MCG    Inhale 1 puff into the lungs 2 (two) times daily. Controller    METHYLPREDNISOLONE (MEDROL DOSEPACK) 4 MG TABLET    use as directed    MONTELUKAST (SINGULAIR) 10 MG TABLET    Take 1 tablet (10 mg total) by mouth every evening.   Current Medications    ALBUTEROL (VENTOLIN HFA) 90 MCG/ACTUATION INHALER    Inhale 2 puffs into the lungs every 6 (six) hours as needed for Wheezing. Rescue    ALBUTEROL-IPRATROPIUM (DUO-NEB) 2.5 MG-0.5 MG/3 ML NEBULIZER SOLUTION    Take 3 mLs by nebulization every 6 (six) hours as needed for Wheezing. Rescue    BUPRENORPHINE-NALOXONE 2-0.5  MG (SUBOXONE) 2-0.5 MG FILM    Place 1 Film under the tongue once daily.    GUAIFENESIN (MUCINEX) 600 MG 12 HR TABLET    Take 1 tablet (600 mg total) by mouth 2 (two) times daily. for 10 days   Discontinued Medications    AZITHROMYCIN (Z-RAMIREZ) 250 MG TABLET    Take 1 tablet (250 mg total) by mouth once daily. Take 2 tablets by mouth on day 1; Take 1 tablet by mouth on days 2-5 for 3 days    BENZONATATE (TESSALON) 100 MG CAPSULE    Take 1 capsule (100 mg total) by mouth 3 (three) times daily as needed for Cough.    PROMETHAZINE (PHENERGAN) 25 MG TABLET    Take 1 tablet (25 mg total) by mouth every 6 (six) hours as needed for Nausea.            Follow up in about 2 weeks (around 10/23/2024).      Rosina Heart, DNP, APRN, FNP-C  Family Medicine  [facility]

## 2024-10-10 ENCOUNTER — TELEPHONE (OUTPATIENT)
Dept: FAMILY MEDICINE | Facility: CLINIC | Age: 59
End: 2024-10-10
Payer: COMMERCIAL

## 2024-10-10 NOTE — TELEPHONE ENCOUNTER
----- Message from Trupti sent at 10/10/2024 10:21 AM CDT -----  Regarding: Self   Type: Patient Returning Call    Who Called: Self    Who Left Message for Patient:  pt states sravani     Does the patient know what this is regarding?: yes he has more paperwork     Would the patient rather a call back or a response via [facility]?  Call back     Best Call Back Number: [phone]      Additional Information:     Thank you.

## 2024-10-14 ENCOUNTER — TELEPHONE (OUTPATIENT)
Dept: FAMILY MEDICINE | Facility: CLINIC | Age: 59
End: 2024-10-14
Payer: COMMERCIAL

## 2024-10-14 NOTE — TELEPHONE ENCOUNTER
----- Message from Buster sent at 10/14/2024 12:19 PM CDT -----  Regarding: Jamin  Type:Patient Callback     Who called: Jamin     What is the request in detail: Pt stated that he would like to get a call from the nurse about his FMLA and temporary Disability paperwork because his  has not received it yet. Please reach to the patient as soon as possible about this.     Can the clinic reply by [facility]? Yes     Would the patient rather a call back or a response via [facility]? Callback     Best call back number: [phone]      Additional Information:

## 2024-10-14 NOTE — PHYSICIAN QUERY
Due to conflicting documentation, please clarify diagnosis as it relates to COPD:    COPD with acute exacerbation

## 2024-10-14 NOTE — TELEPHONE ENCOUNTER
Returned call to patient about disability paperwork. Informed patient that paperwork is complete and a copy for patient has been placed at the  for . Informed patient I faxed paperwork to numbers found within disability paperwork but I did not have a number for patient's HR. Patient contacted job and received fax number and called back to provide. Faxed paperwork to them as well. Patient verbalized understanding.

## 2024-10-23 NOTE — PROGRESS NOTES
Subjective:      Patient ID: Jamin Singleton Jr. is a 59 y.o. male.    Chief Complaint: Shortness of Breath    Pt is a 60 yo CM pmh crohn's disease, tobacco use disorder who presents for evaluation of possible COPD. Pt had recent hospitalization (10/4-5) for acute bronchitis with likely underlying COPD. Treated with CAP coverage and prednisone, nebulizers. Was provided with medrol dose pack on follow up appointment with family medicine and started on montelukast.     Smoking hx: quit ~10/3/24,   Work hx: maintenance/repairs  Exposure hx: dust  Inhaler use: ICS/LABA  PRN inhaler use: albuterol, nebulizer  Hx of lung dz: COPD, asthma as a child  Family hx of lung dz: mother- COPD  montelukast    Review of Systems   Constitutional:  Positive for activity change. Negative for fatigue.   HENT:  Negative for postnasal drip and congestion.    Respiratory:  Positive for cough, sputum production, shortness of breath, wheezing, previous hospitalization due to pulmonary problems, dyspnea on extertion and use of rescue inhaler. Negative for asthma nighttime symptoms.    Cardiovascular:  Negative for chest pain, palpitations and leg swelling.   Musculoskeletal:  Negative for gait problem.   Gastrointestinal:  Positive for acid reflux. Negative for nausea and vomiting.   Neurological:  Negative for dizziness, syncope and light-headedness.   Psychiatric/Behavioral:  Negative for confusion and sleep disturbance. The patient is not nervous/anxious.      Objective:     Physical Exam   Constitutional: He is oriented to person, place, and time. He appears well-developed and well-nourished. He appears not cachectic. He is not obese.   HENT:   Head: Normocephalic.   Cardiovascular: Regular rhythm. Exam reveals no gallop and no friction rub.   No murmur heard.  bradycardic   Pulmonary/Chest: Normal expansion, symmetric chest wall expansion, effort normal and breath sounds normal. He has no wheezes. He has no rhonchi. He has no rales.  "  Musculoskeletal:         General: No edema.   Neurological: He is alert and oriented to person, place, and time. Gait normal.   Skin: No cyanosis. Nails show no clubbing.   Psychiatric: He has a normal mood and affect. His behavior is normal. Judgment and thought content normal.     Personal Diagnostic Review    Chest x-ray: 10/4/24  Hyper expanded lungs. No other cardiopulmonary process.     Echocardiogram: 21  The left ventricle is normal in size with concentric remodeling and normal systolic function.  The estimated ejection fraction is 60%.  Normal right ventricular size with normal right ventricular systolic function.  The sinuses of Valsalva is mildly dilated, 4.0cm.  Indeterminate central venous pressure. Estimated PA systolic pressure is at least 13 mmHg.    Pulmonary function tests:   10/24/24  FEV1: 3.22L  (98.6 % predicted),   FVC:  5.35L (127.7 % predicted),   FEV1/FVC:  60,   T.20L (125.8 % predicted),   DLCO: 22.77 (84.9 % predicted)        10/9/2024     8:03 AM 10/5/2024    11:19 AM 10/5/2024     7:48 AM 10/5/2024     7:19 AM 10/5/2024     4:47 AM 10/5/2024     2:49 AM 10/5/2024    12:18 AM   Pulmonary Function Tests   SpO2 97 % 96 % 96 % 96 % 95 % 97 % 95 %   Height 5' 8" (1.727 m)         Weight 62.1 kg (136 lb 14.5 oz)         BMI (Calculated) 20.8           Assessment:     1. Hx of tobacco use, presenting hazards to health    2. Severe persistent asthma without complication    3. Chronic obstructive pulmonary disease, unspecified COPD type    4. Tobacco use disorder, severe, in early remission, dependence    5. Moderate persistent asthma without complication      Outpatient Encounter Medications as of 10/24/2024   Medication Sig Dispense Refill    albuterol (VENTOLIN HFA) 90 mcg/actuation inhaler Inhale 2 puffs into the lungs every 6 (six) hours as needed for Wheezing. Rescue 18 g 0    albuterol-ipratropium (DUO-NEB) 2.5 mg-0.5 mg/3 mL nebulizer solution Take 3 mLs by nebulization " "every 6 (six) hours as needed for Wheezing. Rescue 75 mL 0    [] benzonatate (TESSALON) 200 MG capsule Take 1 capsule (200 mg total) by mouth 3 (three) times daily as needed for Cough. 30 capsule 0    buprenorphine-naloxone 2-0.5 mg (SUBOXONE) 2-0.5 mg Film Place 1 Film under the tongue once daily.      fluticasone-salmeterol diskus inhaler 100-50 mcg Inhale 1 puff into the lungs 2 (two) times daily. Controller 60 each 0    [] guaiFENesin (MUCINEX) 600 mg 12 hr tablet Take 1 tablet (600 mg total) by mouth 2 (two) times daily. for 10 days 20 tablet 0    methylPREDNISolone (MEDROL DOSEPACK) 4 mg tablet use as directed 21 each 0    montelukast (SINGULAIR) 10 mg tablet Take 1 tablet (10 mg total) by mouth every evening. 30 tablet 0     No facility-administered encounter medications on file as of 10/24/2024.     No orders of the defined types were placed in this encounter.      Plan:     Chronic obstructive pulmonary disease  Fixed obstruction on PFTs. No evaluation of bronchodilator response. Has been using albuterol and started on Wixella, which has decreased need for Albuterol. Has been only using Wixella once a day, instructed on BID use. Can continue use of albuterol and nebulizer as needed.     Significantly improved compared to recent exacerbation. Discussed reducing exposure to triggers by wearing mask or if possible complete avoidance. Pt has history of "bronchitic asthma" as a child that improved as he got older.     Quit smoking a couple of days prior to admission due to symptoms. Has not restarted. Is currently using patches. Informed of other options if he is having issues with cravings. Pt will reach out.     Moderate persistent asthma without complication  As per COPD. Mixed problem. Currently on appropriate therapy. Will monitor for need to escalate. Continue montelukast.     Tobacco use disorder, severe, in early remission, dependence  Dangers of cigarette smoking were reviewed with patient " in detail. Patient was Counseled for 3-10 minutes. Nicotine replacement options were discussed. Nicotine replacement was discussed- not prescribed per patient's request already on nicotine patches and wants to continue this.     Hx of tobacco use, presenting hazards to health  Will discuss lung cancer screening at next appointment.     Follow up in 6 months.     Francesco Olmos MD  [facility]

## 2024-10-24 ENCOUNTER — OFFICE VISIT (OUTPATIENT)
Dept: PULMONOLOGY | Facility: CLINIC | Age: 59
End: 2024-10-24
Payer: COMMERCIAL

## 2024-10-24 ENCOUNTER — HOSPITAL ENCOUNTER (OUTPATIENT)
Dept: PULMONOLOGY | Facility: CLINIC | Age: 59
Discharge: HOME | End: 2024-10-24
Payer: COMMERCIAL

## 2024-10-24 VITALS
HEART RATE: 55 BPM | HEIGHT: 67 IN | WEIGHT: 147 LBS | BODY MASS INDEX: 23.07 KG/M2 | SYSTOLIC BLOOD PRESSURE: 128 MMHG | OXYGEN SATURATION: 94 % | DIASTOLIC BLOOD PRESSURE: 70 MMHG

## 2024-10-24 DIAGNOSIS — J44.9 CHRONIC OBSTRUCTIVE PULMONARY DISEASE, UNSPECIFIED COPD TYPE: ICD-10-CM

## 2024-10-24 DIAGNOSIS — F17.201 TOBACCO USE DISORDER, SEVERE, IN EARLY REMISSION, DEPENDENCE: ICD-10-CM

## 2024-10-24 DIAGNOSIS — Z87.891 HX OF TOBACCO USE, PRESENTING HAZARDS TO HEALTH: Primary | ICD-10-CM

## 2024-10-24 DIAGNOSIS — J45.50 SEVERE PERSISTENT ASTHMA WITHOUT COMPLICATION: ICD-10-CM

## 2024-10-24 DIAGNOSIS — J45.40 MODERATE PERSISTENT ASTHMA WITHOUT COMPLICATION: ICD-10-CM

## 2024-10-24 LAB
DLCO ADJ PRE: 22.77 ML/(MIN*MMHG) (ref 19.9–33.75)
DLCO SINGLE BREATH LLN: 19.9
DLCO SINGLE BREATH PRE REF: 79.4 %
DLCO SINGLE BREATH REF: 26.82
DLCOC SBVA LLN: 2.82
DLCOC SBVA PRE REF: 68.6 %
DLCOC SBVA REF: 4.12
DLCOC SINGLE BREATH LLN: 19.9
DLCOC SINGLE BREATH PRE REF: 84.9 %
DLCOC SINGLE BREATH REF: 26.82
DLCOCSBVAULN: 5.41
DLCOCSINGLEBREATHULN: 33.75
DLCOCSINGLEBREATHZSCORE: -0.96
DLCOSINGLEBREATHULN: 33.75
DLCOSINGLEBREATHZSCORE: -1.31
DLCOVA LLN: 2.82
DLCOVA PRE REF: 64.2 %
DLCOVA PRE: 2.64 ML/(MIN*MMHG*L) (ref 2.82–5.41)
DLCOVA REF: 4.12
DLCOVAULN: 5.41
DLVAADJ PRE: 2.82 ML/(MIN*MMHG*L) (ref 2.82–5.41)
ERV LLN: -16448.87
ERV PRE REF: 147 %
ERV REF: 1.13
ERVULN: ABNORMAL
FEF 25 75 LLN: 1.75
FEF 25 75 PRE REF: 48.7 %
FEF 25 75 REF: 3.46
FEV05 LLN: 1.41
FEV05 REF: 2.55
FEV1 FVC LLN: 66
FEV1 FVC PRE REF: 77.1 %
FEV1 FVC REF: 78
FEV1 LLN: 2.46
FEV1 PRE REF: 98.6 %
FEV1 REF: 3.26
FEV1FVCZSCORE: -2.34
FEV1ZSCORE: -0.1
FRCPLETH LLN: 2.44
FRCPLETH PREREF: 131.6 %
FRCPLETH REF: 3.42
FRCPLETHULN: 4.41
FVC LLN: 3.19
FVC PRE REF: 127.7 %
FVC REF: 4.19
FVCZSCORE: 1.9
IVC PRE: 4.87 L (ref 3.19–5.19)
IVC SINGLE BREATH LLN: 3.19
IVC SINGLE BREATH PRE REF: 116.4 %
IVC SINGLE BREATH REF: 4.19
IVCSINGLEBREATHULN: 5.19
LLN IC: -9999997.05
PEF LLN: 6.51
PEF PRE REF: 67.7 %
PEF REF: 8.63
PHYSICIAN COMMENT: ABNORMAL
PRE DLCO: 21.29 ML/(MIN*MMHG) (ref 19.9–33.75)
PRE ERV: 1.65 L (ref -16448.87–16451.13)
PRE FEF 25 75: 1.69 L/S (ref 1.75–5.18)
PRE FET 100: 6.86 SEC
PRE FEV05 REF: 88.6 %
PRE FEV1 FVC: 60.2 % (ref 66.02–88.55)
PRE FEV1: 3.22 L (ref 2.46–4.02)
PRE FEV5: 2.26 L (ref 1.41–3.68)
PRE FRC PL: 4.51 L (ref 2.44–4.41)
PRE FVC: 5.35 L (ref 3.19–5.19)
PRE IC: 3.69 L (ref -9999997.05–#######.####)
PRE PEF: 5.84 L/S (ref 6.51–10.76)
PRE REF IC: 125 %
PRE RV: 2.85 L (ref 1.62–2.97)
PRE TLC: 8.2 L (ref 5.37–7.67)
RAW PRE REF: 119.2 %
RAW PRE: 3.65 CMH2O*S/L (ref 3.06–3.06)
RAW REF: 3.06
REF IC: 2.95
RV LLN: 1.62
RV PRE REF: 124.1 %
RV REF: 2.3
RVTLC LLN: 28
RVTLC PRE REF: 94.1 %
RVTLC PRE: 34.78 % (ref 27.99–45.95)
RVTLC REF: 37
RVTLCULN: 46
RVULN: 2.97
SGAW PRE REF: 64.9 %
SGAW PRE: 0.05 1/(CMH2O*S) (ref 0.08–0.08)
SGAW REF: 0.08
TLC LLN: 5.37
TLC PRE REF: 125.8 %
TLC REF: 6.52
TLC ULN: 7.67
TLCZSCORE: 2.4
ULN IC: ABNORMAL
VA PRE: 8.06 L (ref 6.37–6.37)
VA SINGLE BREATH LLN: 6.37
VA SINGLE BREATH PRE REF: 126.6 %
VA SINGLE BREATH REF: 6.37
VASINGLEBREATHULN: 6.37
VC LLN: 3.19
VC PRE REF: 127.7 %
VC PRE: 5.35 L (ref 3.19–5.19)
VC REF: 4.19
VC ULN: 5.19

## 2024-10-24 PROCEDURE — 99999 PR PBB SHADOW E&M-EST. PATIENT-LVL III: CPT | Mod: PBBFAC,,, | Performed by: INTERNAL MEDICINE

## 2024-10-24 NOTE — ASSESSMENT & PLAN NOTE
Dangers of cigarette smoking were reviewed with patient in detail. Patient was Counseled for 3-10 minutes. Nicotine replacement options were discussed. Nicotine replacement was discussed- not prescribed per patient's request already on nicotine patches and wants to continue this.

## 2024-10-24 NOTE — ASSESSMENT & PLAN NOTE
"Fixed obstruction on PFTs. No evaluation of bronchodilator response. Has been using albuterol and started on Wixella, which has decreased need for Albuterol. Has been only using Wixella once a day, instructed on BID use. Can continue use of albuterol and nebulizer as needed.     Significantly improved compared to recent exacerbation. Discussed reducing exposure to triggers by wearing mask or if possible complete avoidance. Pt has history of "bronchitic asthma" as a child that improved as he got older.     Quit smoking a couple of days prior to admission due to symptoms. Has not restarted. Is currently using patches. Informed of other options if he is having issues with cravings. Pt will reach out.   "

## 2024-10-24 NOTE — ASSESSMENT & PLAN NOTE
As per COPD. Mixed problem. Currently on appropriate therapy. Will monitor for need to escalate. Continue montelukast.

## 2024-10-28 ENCOUNTER — OFFICE VISIT (OUTPATIENT)
Dept: FAMILY MEDICINE | Facility: CLINIC | Age: 59
End: 2024-10-28
Payer: COMMERCIAL

## 2024-10-28 VITALS
HEART RATE: 53 BPM | DIASTOLIC BLOOD PRESSURE: 64 MMHG | BODY MASS INDEX: 23.39 KG/M2 | HEIGHT: 67 IN | SYSTOLIC BLOOD PRESSURE: 92 MMHG | WEIGHT: 149.06 LBS | OXYGEN SATURATION: 96 %

## 2024-10-28 DIAGNOSIS — Z87.891 HX OF TOBACCO USE, PRESENTING HAZARDS TO HEALTH: ICD-10-CM

## 2024-10-28 DIAGNOSIS — J45.40 MODERATE PERSISTENT ASTHMA WITHOUT COMPLICATION: ICD-10-CM

## 2024-10-28 DIAGNOSIS — J44.9 CHRONIC OBSTRUCTIVE PULMONARY DISEASE, UNSPECIFIED COPD TYPE: Primary | ICD-10-CM

## 2024-10-28 DIAGNOSIS — Z12.5 ENCOUNTER FOR SCREENING PROSTATE SPECIFIC ANTIGEN (PSA) MEASUREMENT: ICD-10-CM

## 2024-10-28 DIAGNOSIS — Z23 NEEDS FLU SHOT: ICD-10-CM

## 2024-10-28 PROCEDURE — 1159F MED LIST DOCD IN RCRD: CPT | Mod: CPTII,S$GLB,, | Performed by: NURSE PRACTITIONER

## 2024-10-28 PROCEDURE — 90656 IIV3 VACC NO PRSV 0.5 ML IM: CPT | Mod: S$GLB,,, | Performed by: NURSE PRACTITIONER

## 2024-10-28 PROCEDURE — 3074F SYST BP LT 130 MM HG: CPT | Mod: CPTII,S$GLB,, | Performed by: NURSE PRACTITIONER

## 2024-10-28 PROCEDURE — 99213 OFFICE O/P EST LOW 20 MIN: CPT | Mod: 25,S$GLB,, | Performed by: NURSE PRACTITIONER

## 2024-10-28 PROCEDURE — 90471 IMMUNIZATION ADMIN: CPT | Mod: S$GLB,,, | Performed by: NURSE PRACTITIONER

## 2024-10-28 PROCEDURE — 3008F BODY MASS INDEX DOCD: CPT | Mod: CPTII,S$GLB,, | Performed by: NURSE PRACTITIONER

## 2024-10-28 PROCEDURE — 1111F DSCHRG MED/CURRENT MED MERGE: CPT | Mod: CPTII,S$GLB,, | Performed by: NURSE PRACTITIONER

## 2024-10-28 PROCEDURE — 3078F DIAST BP <80 MM HG: CPT | Mod: CPTII,S$GLB,, | Performed by: NURSE PRACTITIONER

## 2024-10-28 PROCEDURE — 99999 PR PBB SHADOW E&M-EST. PATIENT-LVL III: CPT | Mod: PBBFAC,,, | Performed by: NURSE PRACTITIONER

## 2024-11-05 DIAGNOSIS — R06.09 DYSPNEA ON EXERTION: ICD-10-CM

## 2024-11-05 RX ORDER — FLUTICASONE PROPIONATE AND SALMETEROL 100; 50 UG/1; UG/1
1 POWDER RESPIRATORY (INHALATION) 2 TIMES DAILY
Qty: 60 EACH | Refills: 0 | Status: SHIPPED | OUTPATIENT
Start: 2024-11-05

## 2024-11-06 DIAGNOSIS — J30.1 SEASONAL ALLERGIC RHINITIS DUE TO POLLEN: ICD-10-CM

## 2024-11-07 RX ORDER — MONTELUKAST SODIUM 10 MG/1
10 TABLET ORAL NIGHTLY
Qty: 30 TABLET | Refills: 0 | Status: SHIPPED | OUTPATIENT
Start: 2024-11-07

## 2024-12-04 DIAGNOSIS — R06.09 DYSPNEA ON EXERTION: ICD-10-CM

## 2024-12-04 RX ORDER — FLUTICASONE PROPIONATE AND SALMETEROL 100; 50 UG/1; UG/1
1 POWDER RESPIRATORY (INHALATION) 2 TIMES DAILY
Qty: 60 EACH | Refills: 0 | Status: SHIPPED | OUTPATIENT
Start: 2024-12-04

## 2025-02-19 ENCOUNTER — PATIENT OUTREACH (OUTPATIENT)
Dept: ADMINISTRATIVE | Facility: HOSPITAL | Age: 60
End: 2025-02-19
Payer: COMMERCIAL